# Patient Record
Sex: MALE
[De-identification: names, ages, dates, MRNs, and addresses within clinical notes are randomized per-mention and may not be internally consistent; named-entity substitution may affect disease eponyms.]

---

## 2019-11-05 ENCOUNTER — HOSPITAL ENCOUNTER (INPATIENT)
Dept: HOSPITAL 88 - ER | Age: 34
LOS: 10 days | Discharge: HOME | DRG: 871 | End: 2019-11-15
Attending: INTERNAL MEDICINE | Admitting: INTERNAL MEDICINE
Payer: SELF-PAY

## 2019-11-05 VITALS — HEIGHT: 65 IN | BODY MASS INDEX: 25.03 KG/M2 | WEIGHT: 150.25 LBS

## 2019-11-05 VITALS — DIASTOLIC BLOOD PRESSURE: 78 MMHG | SYSTOLIC BLOOD PRESSURE: 130 MMHG

## 2019-11-05 VITALS — SYSTOLIC BLOOD PRESSURE: 130 MMHG | DIASTOLIC BLOOD PRESSURE: 78 MMHG

## 2019-11-05 DIAGNOSIS — F17.200: ICD-10-CM

## 2019-11-05 DIAGNOSIS — A15.0: ICD-10-CM

## 2019-11-05 DIAGNOSIS — R91.1: ICD-10-CM

## 2019-11-05 DIAGNOSIS — J98.4: ICD-10-CM

## 2019-11-05 DIAGNOSIS — D69.6: ICD-10-CM

## 2019-11-05 DIAGNOSIS — E87.6: ICD-10-CM

## 2019-11-05 DIAGNOSIS — J13: ICD-10-CM

## 2019-11-05 DIAGNOSIS — A40.3: Primary | ICD-10-CM

## 2019-11-05 DIAGNOSIS — D64.9: ICD-10-CM

## 2019-11-05 LAB
ALBUMIN SERPL-MCNC: 3.3 G/DL (ref 3.5–5)
ALBUMIN/GLOB SERPL: 0.7 {RATIO} (ref 0.8–2)
ALP SERPL-CCNC: 58 IU/L (ref 40–150)
ALT SERPL-CCNC: 16 IU/L (ref 0–55)
ANION GAP SERPL CALC-SCNC: 15 MMOL/L (ref 8–16)
BACTERIA URNS QL MICRO: (no result) /HPF
BASOPHILS # BLD AUTO: 0.1 10*3/UL (ref 0–0.1)
BASOPHILS NFR BLD AUTO: 0.5 % (ref 0–1)
BILIRUB UR QL: NEGATIVE
BUN SERPL-MCNC: 11 MG/DL (ref 7–26)
BUN/CREAT SERPL: 11 (ref 6–25)
CALCIUM SERPL-MCNC: 10 MG/DL (ref 8.4–10.2)
CHLORIDE SERPL-SCNC: 91 MMOL/L (ref 98–107)
CK MB SERPL-MCNC: 0.1 NG/ML (ref 0–5)
CK SERPL-CCNC: 46 IU/L (ref 30–200)
CLARITY UR: (no result)
CO2 SERPL-SCNC: 24 MMOL/L (ref 22–29)
COLOR UR: YELLOW
DEPRECATED NEUTROPHILS # BLD AUTO: 15.2 10*3/UL (ref 2.1–6.9)
DEPRECATED RBC URNS MANUAL-ACNC: (no result) /HPF (ref 0–5)
EGFRCR SERPLBLD CKD-EPI 2021: > 60 ML/MIN (ref 60–?)
EOSINOPHIL # BLD AUTO: 0.1 10*3/UL (ref 0–0.4)
EOSINOPHIL NFR BLD AUTO: 0.3 % (ref 0–6)
EPI CELLS URNS QL MICRO: (no result) /LPF
ERYTHROCYTE [DISTWIDTH] IN CORD BLOOD: 13.3 % (ref 11.7–14.4)
FLUAV + FLUBV AG SPEC IF: NEGATIVE
GLOBULIN PLAS-MCNC: 4.9 G/DL (ref 2.3–3.5)
GLUCOSE SERPLBLD-MCNC: 117 MG/DL (ref 74–118)
HCT VFR BLD AUTO: 42 % (ref 38.2–49.6)
HGB BLD-MCNC: 14.6 G/DL (ref 14–18)
KETONES UR QL STRIP.AUTO: (no result)
LEUKOCYTE ESTERASE UR QL STRIP.AUTO: NEGATIVE
LYMPHOCYTES # BLD: 1 10*3/UL (ref 1–3.2)
LYMPHOCYTES NFR BLD AUTO: 5.7 % (ref 18–39.1)
LYMPHOCYTES NFR BLD MANUAL: 2 % (ref 19–48)
MAGNESIUM SERPL-MCNC: 1.6 MG/DL (ref 1.3–2.1)
MCH RBC QN AUTO: 30.2 PG (ref 28–32)
MCHC RBC AUTO-ENTMCNC: 34.8 G/DL (ref 31–35)
MCV RBC AUTO: 86.8 FL (ref 81–99)
MONOCYTES # BLD AUTO: 1.6 10*3/UL (ref 0.2–0.8)
MONOCYTES NFR BLD AUTO: 8.6 % (ref 4.4–11.3)
MONOCYTES NFR BLD MANUAL: 10 % (ref 3.4–9)
NEUTS SEG NFR BLD AUTO: 84.5 % (ref 38.7–80)
NEUTS SEG NFR BLD MANUAL: 86 % (ref 40–74)
NITRITE UR QL STRIP.AUTO: NEGATIVE
PLAT MORPH BLD: NORMAL
PLATELET # BLD AUTO: 116 X10E3/UL (ref 140–360)
PLATELET # BLD EST: ADEQUATE 10*3/UL
POTASSIUM SERPL-SCNC: 3 MMOL/L (ref 3.5–5.1)
PROT UR QL STRIP.AUTO: (no result)
RBC # BLD AUTO: 4.84 X10E6/UL (ref 4.3–5.7)
RBC MORPH BLD: NORMAL
S PYO AG THROAT QL: POSITIVE
SODIUM SERPL-SCNC: 127 MMOL/L (ref 136–145)
SP GR UR STRIP: 1.01 (ref 1.01–1.02)
UROBILINOGEN UR STRIP-MCNC: 1 MG/DL (ref 0.2–1)
WBC #/AREA URNS HPF: (no result) /HPF (ref 0–5)

## 2019-11-05 PROCEDURE — 83518 IMMUNOASSAY DIPSTICK: CPT

## 2019-11-05 PROCEDURE — 81001 URINALYSIS AUTO W/SCOPE: CPT

## 2019-11-05 PROCEDURE — 87206 SMEAR FLUORESCENT/ACID STAI: CPT

## 2019-11-05 PROCEDURE — 71045 X-RAY EXAM CHEST 1 VIEW: CPT

## 2019-11-05 PROCEDURE — 87109 MYCOPLASMA: CPT

## 2019-11-05 PROCEDURE — 93005 ELECTROCARDIOGRAM TRACING: CPT

## 2019-11-05 PROCEDURE — 88312 SPECIAL STAINS GROUP 1: CPT

## 2019-11-05 PROCEDURE — 31622 DX BRONCHOSCOPE/WASH: CPT

## 2019-11-05 PROCEDURE — 99284 EMERGENCY DEPT VISIT MOD MDM: CPT

## 2019-11-05 PROCEDURE — 88342 IMHCHEM/IMCYTCHM 1ST ANTB: CPT

## 2019-11-05 PROCEDURE — 86635 COCCIDIOIDES ANTIBODY: CPT

## 2019-11-05 PROCEDURE — 36415 COLL VENOUS BLD VENIPUNCTURE: CPT

## 2019-11-05 PROCEDURE — 94640 AIRWAY INHALATION TREATMENT: CPT

## 2019-11-05 PROCEDURE — 87070 CULTURE OTHR SPECIMN AEROBIC: CPT

## 2019-11-05 PROCEDURE — 82550 ASSAY OF CK (CPK): CPT

## 2019-11-05 PROCEDURE — 87385 HISTOPLASMA CAPSUL AG IA: CPT

## 2019-11-05 PROCEDURE — 87205 SMEAR GRAM STAIN: CPT

## 2019-11-05 PROCEDURE — 80053 COMPREHEN METABOLIC PANEL: CPT

## 2019-11-05 PROCEDURE — 87400 INFLUENZA A/B EACH AG IA: CPT

## 2019-11-05 PROCEDURE — 87040 BLOOD CULTURE FOR BACTERIA: CPT

## 2019-11-05 PROCEDURE — 83735 ASSAY OF MAGNESIUM: CPT

## 2019-11-05 PROCEDURE — 82553 CREATINE MB FRACTION: CPT

## 2019-11-05 PROCEDURE — 71046 X-RAY EXAM CHEST 2 VIEWS: CPT

## 2019-11-05 PROCEDURE — 87390 HIV-1 AG IA: CPT

## 2019-11-05 PROCEDURE — 87116 MYCOBACTERIA CULTURE: CPT

## 2019-11-05 PROCEDURE — 87102 FUNGUS ISOLATION CULTURE: CPT

## 2019-11-05 PROCEDURE — 88304 TISSUE EXAM BY PATHOLOGIST: CPT

## 2019-11-05 PROCEDURE — 71260 CT THORAX DX C+: CPT

## 2019-11-05 PROCEDURE — 84484 ASSAY OF TROPONIN QUANT: CPT

## 2019-11-05 PROCEDURE — 85025 COMPLETE CBC W/AUTO DIFF WBC: CPT

## 2019-11-05 PROCEDURE — 87335 E COLI 0157 AG IA: CPT

## 2019-11-05 RX ADMIN — SODIUM CHLORIDE SCH MLS/HR: 9 INJECTION, SOLUTION INTRAVENOUS at 18:12

## 2019-11-05 RX ADMIN — TAZOBACTAM SODIUM AND PIPERACILLIN SODIUM SCH MLS/HR: 375; 3 INJECTION, SOLUTION INTRAVENOUS at 21:22

## 2019-11-05 RX ADMIN — Medication SCH ML: at 23:30

## 2019-11-05 RX ADMIN — SODIUM CHLORIDE SCH MLS/HR: 9 INJECTION, SOLUTION INTRAVENOUS at 22:01

## 2019-11-05 NOTE — DIAGNOSTIC IMAGING REPORT
EXAMINATION:  CHEST 2 VIEWS    



INDICATION: Chest pain, shortness of breath



COMPARISON: None

     

FINDINGS:



LINES/TUBES:None



LUNGS:The lungs are well-inflated. There is a 6.5 x 5.5 cm consolidation at the

lateral left midlung zone with internal lucent component.



PLEURA:No pleural effusion or pneumothorax.



MEDIASTINUM:The cardiomediastinal silhouette appears normal in size and shape.



BONES/SOFT TISSUES:No acute osseous injury.



ABDOMEN:No free air under the diaphragm.





IMPRESSION: 

Left midlung zone 6.5 x 5.5 cm consolidation with internal lucency for which

the differential includes pneumonia or a cavitary mass lesion.



RECOMMENDATION:

Chest CT for further evaluation.



Signed by: Miryam Chinchilla MD on 11/5/2019 1:13 PM

## 2019-11-05 NOTE — XMS REPORT
Patient Summary Document

                             Created on: 2019



ROBERT VORA

External Reference #: [id number]

: 1985

Sex: Male



Demographics







                          Address                   [address] [place] [address]

 

                          Home Phone                [phone]

 

                          Preferred Language        Unknown

 

                          Marital Status            Unknown

 

                          Advent Affiliation     Unknown

 

                          Race                      Unknown

 

                                        Additional Race(s)  

 

                          Ethnic Group              Unknown





Author







                          Author                    [facility]

 

                          Address                   Unknown

 

                          Phone                     Unavailable







Care Team Providers







                    Care Team Member Name    Role                Phone

 

                    ERICA REYNOLDS    Unavailable         Unavailable







Problems

This patient has no known problems.



Allergies, Adverse Reactions, Alerts

This patient has no known allergies or adverse reactions.



Medications

This patient has no known medications.



Results







           Test Description    Test Time    Test Comments    Text Results    Atomic Results    Result

 Comments

 

                CHEST 2 VIEWS    2019 13:11:00                                                             

                                             [place] [address]  
   Patient Name: ROBERT VORA                                   MR #: 
[id number]                     : 1985                                  
Age/Sex: 34/M  Acct #: [id number]                              Req #: [id number]
[id number]  Adm Physician:                                                      
Ordered by: ERICA REYNOLDS MD, MD                            Report #: [id number]
       Location: ER                                      Room/Bed:              
      
___________________________________________________________________________________________________
   Procedure: [id number] DX/CHEST 2 VIEWS  Exam Date:                            
Exam Time:                                               REPORT STATUS: Signed  
 EXAMINATION:  CHEST 2 VIEWS          INDICATION: Chest pain, shortness of 
breath      COMPARISON: None           FINDINGS:      LINES/TUBES:None      
LUNGS:The lungs are well-inflated. There is a 6.5 x 5.5 cm consolidation at the 
 lateral left midlung zone with internal lucent component.      PLEURA:No 
pleural effusion or pneumothorax.      MEDIASTINUM:The cardiomediastinal 
silhouette appears normal in size and shape.      BONES/SOFT TISSUES:No acute 
osseous injury.      ABDOMEN:No free air under the diaphragm.         
IMPRESSION:    Left midlung zone 6.5 x 5.5 cm consolidation with internal 
lucency for which   the differential includes pneumonia or a cavitary mass 
lesion.      RECOMMENDATION:   Chest CT for further evaluation.      Signed by: 
Stacy Chinchilla MD on 2019 1:13 PM        Dictated By: STACY CHINCHILLA MD  
Electronically Signed By: STACY CHINCHILLA MD on 19 1313  Transcribed By: 
HI on 19       COPY TO:   ERICA REYNOLDS

## 2019-11-05 NOTE — DIAGNOSTIC IMAGING REPORT
CT chest with enhancement 



CPT code: 22093



INDICATION: Fever, cough



TECHNIQUE: 5 mm collimation axial images obtained from the thoracic inlet to

the level of the diaphragm following uneventful administration of 100 cc of low

osmolar, nonionic intravenous contrast.



RADIATION DOSE:

     Total DLP: 573.92 mGy*cm

     Estimated effective dose: (DLP x 0.015 x size factor) mSv

     CTDIvol has been reviewed. It is below the limits set by the Radiation

Protocol Committee (RPC).



Dose reduction techniques used: Automated exposure control, adjustment of the

mAs and/or kVp according to patient size, standardized low-dose protocol,

and/or iterative reconstruction technique.



Comparison: Chest x-ray 1301 hours.



CHEST FINDINGS:



Lymph nodes: No enlarged axillary or supraclavicular lymph nodes. There are

multiple prominent mediastinal lymph nodes, particularly in the AP window and

the subcarinal spaces. Left hilar lymph nodes are prominent.



Thyroid: Visualized portions are normal.



Mediastinum: No pericardial effusion.  Heart and great vessels enhance normally

without filling defects.  The esophagus is normal.



Lungs: 



Right Lung: Subtle band of subsegmental atelectasis in the posterior upper

lobe.



Left Lung: Large area of dense infiltrate in the upper lobe with several areas

of central cavitation, the largest measuring 1.8 cm. There are patchy areas of

groundglass attenuation in the superior segment of the lower lobe and in the

lingula.



Airways: Clear.



Pleura:  Trace posterior left pleural effusion. No pneumothorax.



ABDOMEN: 



There are coarse calcifications in the spleen. No soft tissue mass or

lymphadenopathy in the visualized upper abdomen.



Bones: No focal osseous lesions.



IMPRESSION:



1. Large infiltrate in the left upper lobe with cavitations and multiple

infiltrates in the lingula and lower lobe. Findings are suggestive of cavitary

pneumonia. Recommend close interval follow-up to document interval

change/resolution.



2.  Enlarged mediastinal and left hilar lymph nodes are likely reactive.



Signed by: Dr. Riley Treadwell MD on 11/5/2019 5:40 PM

## 2019-11-05 NOTE — NUR
Per Dr. Hamm place patient on airborne precautions to r/o TB due to CT 
chest read:  Large area of dense infiltrate in the upper lobe with several 
areas of central cavitation.

## 2019-11-05 NOTE — NUR
Patient received via stretcher from ER accompanied by friend. Patient is AAO x 3. Patient 
had no complaints of pain. Respirations even and non-labored. Admission history obtained. 
Initial physical assessment performed. Patient oriented to room, call light  and plan of 
care.  Airborne and fall precautions in place. IVF infusing at 125 cc/hr. Patient instructed 
to call for assistance when needed. Call light within reach.

## 2019-11-06 VITALS — DIASTOLIC BLOOD PRESSURE: 71 MMHG | SYSTOLIC BLOOD PRESSURE: 135 MMHG

## 2019-11-06 VITALS — SYSTOLIC BLOOD PRESSURE: 144 MMHG | DIASTOLIC BLOOD PRESSURE: 88 MMHG

## 2019-11-06 VITALS — SYSTOLIC BLOOD PRESSURE: 127 MMHG | DIASTOLIC BLOOD PRESSURE: 64 MMHG

## 2019-11-06 VITALS — DIASTOLIC BLOOD PRESSURE: 78 MMHG | SYSTOLIC BLOOD PRESSURE: 130 MMHG

## 2019-11-06 VITALS — DIASTOLIC BLOOD PRESSURE: 70 MMHG | SYSTOLIC BLOOD PRESSURE: 129 MMHG

## 2019-11-06 VITALS — SYSTOLIC BLOOD PRESSURE: 126 MMHG | DIASTOLIC BLOOD PRESSURE: 64 MMHG

## 2019-11-06 VITALS — SYSTOLIC BLOOD PRESSURE: 129 MMHG | DIASTOLIC BLOOD PRESSURE: 70 MMHG

## 2019-11-06 LAB
ALBUMIN SERPL-MCNC: 2.5 G/DL (ref 3.5–5)
ALBUMIN/GLOB SERPL: 0.6 {RATIO} (ref 0.8–2)
ALP SERPL-CCNC: 62 IU/L (ref 40–150)
ALT SERPL-CCNC: 15 IU/L (ref 0–55)
ANION GAP SERPL CALC-SCNC: 13.2 MMOL/L (ref 8–16)
BASOPHILS # BLD AUTO: 0.1 10*3/UL (ref 0–0.1)
BASOPHILS NFR BLD AUTO: 0.4 % (ref 0–1)
BUN SERPL-MCNC: 7 MG/DL (ref 7–26)
BUN/CREAT SERPL: 9 (ref 6–25)
CALCIUM SERPL-MCNC: 9 MG/DL (ref 8.4–10.2)
CHLORIDE SERPL-SCNC: 99 MMOL/L (ref 98–107)
CK MB SERPL-MCNC: 0.3 NG/ML (ref 0–5)
CK MB SERPL-MCNC: 0.3 NG/ML (ref 0–5)
CK SERPL-CCNC: 33 IU/L (ref 30–200)
CK SERPL-CCNC: 37 IU/L (ref 30–200)
CO2 SERPL-SCNC: 24 MMOL/L (ref 22–29)
DEPRECATED NEUTROPHILS # BLD AUTO: 11.4 10*3/UL (ref 2.1–6.9)
EGFRCR SERPLBLD CKD-EPI 2021: > 60 ML/MIN (ref 60–?)
EOSINOPHIL # BLD AUTO: 0 10*3/UL (ref 0–0.4)
EOSINOPHIL NFR BLD AUTO: 0.2 % (ref 0–6)
EOSINOPHIL NFR BLD MANUAL: 1 % (ref 0–7)
ERYTHROCYTE [DISTWIDTH] IN CORD BLOOD: 13.6 % (ref 11.7–14.4)
GLOBULIN PLAS-MCNC: 4.3 G/DL (ref 2.3–3.5)
GLUCOSE SERPLBLD-MCNC: 114 MG/DL (ref 74–118)
HCT VFR BLD AUTO: 36.7 % (ref 38.2–49.6)
HGB BLD-MCNC: 12.8 G/DL (ref 14–18)
HIV1+2 AB SPEC QL IA.RAPID: (no result)
LYMPHOCYTES # BLD: 0.4 10*3/UL (ref 1–3.2)
LYMPHOCYTES NFR BLD AUTO: 3.3 % (ref 18–39.1)
LYMPHOCYTES NFR BLD MANUAL: 7 % (ref 19–48)
MCH RBC QN AUTO: 30.1 PG (ref 28–32)
MCHC RBC AUTO-ENTMCNC: 34.9 G/DL (ref 31–35)
MCV RBC AUTO: 86.4 FL (ref 81–99)
MONOCYTES # BLD AUTO: 1.5 10*3/UL (ref 0.2–0.8)
MONOCYTES NFR BLD AUTO: 11.1 % (ref 4.4–11.3)
MONOCYTES NFR BLD MANUAL: 12 % (ref 3.4–9)
NEUTS BAND NFR BLD MANUAL: 1 %
NEUTS SEG NFR BLD AUTO: 84.2 % (ref 38.7–80)
NEUTS SEG NFR BLD MANUAL: 78 % (ref 40–74)
NRBC/RBC NFR BLD MANUAL: 1 %
PLAT MORPH BLD: (no result)
PLATELET # BLD AUTO: 120 X10E3/UL (ref 140–360)
PLATELET # BLD EST: (no result) 10*3/UL
POTASSIUM SERPL-SCNC: 3.2 MMOL/L (ref 3.5–5.1)
RBC # BLD AUTO: 4.25 X10E6/UL (ref 4.3–5.7)
RBC MORPH BLD: NORMAL
SODIUM SERPL-SCNC: 133 MMOL/L (ref 136–145)

## 2019-11-06 RX ADMIN — TAZOBACTAM SODIUM AND PIPERACILLIN SODIUM SCH MLS/HR: 375; 3 INJECTION, SOLUTION INTRAVENOUS at 02:15

## 2019-11-06 RX ADMIN — Medication PRN MG: at 00:09

## 2019-11-06 RX ADMIN — Medication SCH ML: at 07:00

## 2019-11-06 RX ADMIN — Medication SCH ML: at 11:00

## 2019-11-06 RX ADMIN — SODIUM CHLORIDE SCH MLS/HR: 9 INJECTION, SOLUTION INTRAVENOUS at 22:01

## 2019-11-06 RX ADMIN — Medication SCH ML: at 18:55

## 2019-11-06 RX ADMIN — TAZOBACTAM SODIUM AND PIPERACILLIN SODIUM SCH MLS/HR: 375; 3 INJECTION, SOLUTION INTRAVENOUS at 13:13

## 2019-11-06 RX ADMIN — SODIUM CHLORIDE SCH MLS/HR: 9 INJECTION, SOLUTION INTRAVENOUS at 17:12

## 2019-11-06 RX ADMIN — TAZOBACTAM SODIUM AND PIPERACILLIN SODIUM SCH MLS/HR: 375; 3 INJECTION, SOLUTION INTRAVENOUS at 07:53

## 2019-11-06 RX ADMIN — POTASSIUM CHLORIDE SCH MEQ: 1500 TABLET, EXTENDED RELEASE ORAL at 05:47

## 2019-11-06 RX ADMIN — POTASSIUM CHLORIDE SCH MEQ: 1500 TABLET, EXTENDED RELEASE ORAL at 17:12

## 2019-11-06 RX ADMIN — Medication SCH ML: at 04:00

## 2019-11-06 RX ADMIN — Medication SCH ML: at 18:50

## 2019-11-06 RX ADMIN — TAZOBACTAM SODIUM AND PIPERACILLIN SODIUM SCH MLS/HR: 375; 3 INJECTION, SOLUTION INTRAVENOUS at 21:31

## 2019-11-06 RX ADMIN — SODIUM CHLORIDE SCH MLS/HR: 9 INJECTION, SOLUTION INTRAVENOUS at 05:48

## 2019-11-06 RX ADMIN — LEVOFLOXACIN SCH MLS/HR: 5 INJECTION, SOLUTION INTRAVENOUS at 08:29

## 2019-11-06 RX ADMIN — Medication SCH ML: at 23:15

## 2019-11-06 RX ADMIN — Medication PRN MG: at 17:13

## 2019-11-06 RX ADMIN — Medication SCH ML: at 15:10

## 2019-11-06 RX ADMIN — Medication PRN MG: at 08:29

## 2019-11-06 NOTE — NUR
RECEIVED CALLBACK FROM DR. HENDRICKSON- DR. HENDRICKSON INFORMED ON PATIENT'S TEMPERATURE .9 

ORDER TO CONTINUE ZOSYN 3.375GM Q6H. NO ORDER FOR BLOOD CULTURES AT THIS TIME; PATIENT'S 
LAST BLOOD CULTURES WERE OBTAINED ON 11/5/19.

## 2019-11-06 NOTE — NUR
CALL PLACED OUT TO DR. HENDRICKSON REGARDING PATIENT'S TEMPERATURE .9- AWAITING CALLBACK. 
PO TYLENOL ADMINISTERED TO PATIENT.

## 2019-11-06 NOTE — DIAGNOSTIC IMAGING REPORT
EXAMINATION:  CHEST SINGLE (PORTABLE)    



INDICATION: Pneumonia. 



COMPARISON:  Chest radiograph and CT Chest 11/5/2019.

     

FINDINGS:

TUBES and LINES:  None.



LUNGS: Low lung volumes. Persistent cavitary consolidation in the left midlung.

Mild patchy right lower lung opacity.



PLEURA:  No pleural effusion or pneumothorax. 



HEART AND MEDIASTINUM:  The cardiomediastinal silhouette is unchanged.



BONES AND SOFT TISSUES:  No acute osseous abnormality.



UPPER ABDOMEN: No free air under the diaphragm.    



IMPRESSION: 

Persistent cavitary consolidation in the left lung, consistent with pneumonia

as seen on CT chest. Mild patchy right lower lung opacity may represent

developing pneumonia or atelectasis.



Signed by: Dr. Yoli Marsh MD on 11/6/2019 5:27 AM

## 2019-11-06 NOTE — NUR
PATIENT IS AWAKE AND IN STABLE CONDITION WITH NO S/S OF RESPIRATORY DISTRESS. NO PAIN 
VOICED. IV FLUIDS INFUSING. TELEMETRY APPLIED. CALL LIGHT IS WITHIN REACH, PATIENT 
INSTRUCTED TO CALL FOR ASSISTANCE AS NEEDED.

## 2019-11-06 NOTE — NUR
Dr. CORRIE Hoover paged for "Routine Consult" of patient. Reason: Left PNA, Cavitary. Awaiting 
call back.

## 2019-11-06 NOTE — NUR
Dr. ALBERTO Walker paged for "Routine Consult" of patient. Reason: Cavitary Pneumonia. Awaiting 
call back.

## 2019-11-06 NOTE — NUR
PATIENT IS IN STABLE CONDITION WITH NO S/S OF RESPIRATORY DISTRESS-NO PAIN VOICED. CALL 
LIGHT IS WITHIN REACH, PATIENT INSTRUCTED TO CALL FOR ASSISTANCE AS NEEDED. FRIEND PRESENT 
IN ROOM. REPORT GIVEN TO ONCOMING NURSE.

## 2019-11-07 VITALS — SYSTOLIC BLOOD PRESSURE: 127 MMHG | DIASTOLIC BLOOD PRESSURE: 62 MMHG

## 2019-11-07 VITALS — DIASTOLIC BLOOD PRESSURE: 82 MMHG | SYSTOLIC BLOOD PRESSURE: 137 MMHG

## 2019-11-07 VITALS — DIASTOLIC BLOOD PRESSURE: 80 MMHG | SYSTOLIC BLOOD PRESSURE: 132 MMHG

## 2019-11-07 VITALS — DIASTOLIC BLOOD PRESSURE: 69 MMHG | SYSTOLIC BLOOD PRESSURE: 118 MMHG

## 2019-11-07 VITALS — SYSTOLIC BLOOD PRESSURE: 125 MMHG | DIASTOLIC BLOOD PRESSURE: 75 MMHG

## 2019-11-07 VITALS — SYSTOLIC BLOOD PRESSURE: 137 MMHG | DIASTOLIC BLOOD PRESSURE: 82 MMHG

## 2019-11-07 VITALS — DIASTOLIC BLOOD PRESSURE: 94 MMHG | SYSTOLIC BLOOD PRESSURE: 153 MMHG

## 2019-11-07 LAB
ALBUMIN SERPL-MCNC: 2.3 G/DL (ref 3.5–5)
ALBUMIN/GLOB SERPL: 0.5 {RATIO} (ref 0.8–2)
ALP SERPL-CCNC: 89 IU/L (ref 40–150)
ALT SERPL-CCNC: 60 IU/L (ref 0–55)
ANION GAP SERPL CALC-SCNC: 12.1 MMOL/L (ref 8–16)
BASOPHILS # BLD AUTO: 0.1 10*3/UL (ref 0–0.1)
BASOPHILS NFR BLD AUTO: 0.4 % (ref 0–1)
BUN SERPL-MCNC: < 5 MG/DL (ref 7–26)
BUN/CREAT SERPL: 7 (ref 6–25)
CALCIUM SERPL-MCNC: 9 MG/DL (ref 8.4–10.2)
CHLORIDE SERPL-SCNC: 99 MMOL/L (ref 98–107)
CO2 SERPL-SCNC: 25 MMOL/L (ref 22–29)
DEPRECATED NEUTROPHILS # BLD AUTO: 8.4 10*3/UL (ref 2.1–6.9)
EGFRCR SERPLBLD CKD-EPI 2021: > 60 ML/MIN (ref 60–?)
EOSINOPHIL # BLD AUTO: 0 10*3/UL (ref 0–0.4)
EOSINOPHIL NFR BLD AUTO: 0.3 % (ref 0–6)
ERYTHROCYTE [DISTWIDTH] IN CORD BLOOD: 14 % (ref 11.7–14.4)
GLOBULIN PLAS-MCNC: 4.3 G/DL (ref 2.3–3.5)
GLUCOSE SERPLBLD-MCNC: 103 MG/DL (ref 74–118)
HCT VFR BLD AUTO: 35.7 % (ref 38.2–49.6)
HGB BLD-MCNC: 12.3 G/DL (ref 14–18)
LYMPHOCYTES # BLD: 0.9 10*3/UL (ref 1–3.2)
LYMPHOCYTES NFR BLD AUTO: 8.1 % (ref 18–39.1)
MAGNESIUM SERPL-MCNC: 1.7 MG/DL (ref 1.3–2.1)
MCH RBC QN AUTO: 29.8 PG (ref 28–32)
MCHC RBC AUTO-ENTMCNC: 34.5 G/DL (ref 31–35)
MCV RBC AUTO: 86.4 FL (ref 81–99)
MONOCYTES # BLD AUTO: 1.6 10*3/UL (ref 0.2–0.8)
MONOCYTES NFR BLD AUTO: 14.6 % (ref 4.4–11.3)
NEUTS SEG NFR BLD AUTO: 75 % (ref 38.7–80)
PLAT MORPH BLD: (no result)
PLATELET # BLD AUTO: 135 X10E3/UL (ref 140–360)
PLATELET # BLD EST: (no result) 10*3/UL
POTASSIUM SERPL-SCNC: 3.1 MMOL/L (ref 3.5–5.1)
RBC # BLD AUTO: 4.13 X10E6/UL (ref 4.3–5.7)
RBC MORPH BLD: NORMAL
SODIUM SERPL-SCNC: 133 MMOL/L (ref 136–145)

## 2019-11-07 RX ADMIN — Medication SCH ML: at 03:20

## 2019-11-07 RX ADMIN — SODIUM CHLORIDE SCH MLS/HR: 9 INJECTION, SOLUTION INTRAVENOUS at 16:08

## 2019-11-07 RX ADMIN — Medication SCH ML: at 15:23

## 2019-11-07 RX ADMIN — Medication SCH ML: at 22:45

## 2019-11-07 RX ADMIN — Medication SCH ML: at 18:55

## 2019-11-07 RX ADMIN — Medication PRN MG: at 00:41

## 2019-11-07 RX ADMIN — POTASSIUM CHLORIDE SCH MEQ: 1500 TABLET, EXTENDED RELEASE ORAL at 17:16

## 2019-11-07 RX ADMIN — TAZOBACTAM SODIUM AND PIPERACILLIN SODIUM SCH MLS/HR: 375; 3 INJECTION, SOLUTION INTRAVENOUS at 13:23

## 2019-11-07 RX ADMIN — Medication SCH ML: at 07:27

## 2019-11-07 RX ADMIN — POTASSIUM CHLORIDE SCH MEQ: 1500 TABLET, EXTENDED RELEASE ORAL at 05:51

## 2019-11-07 RX ADMIN — LEVOFLOXACIN SCH MLS/HR: 5 INJECTION, SOLUTION INTRAVENOUS at 08:05

## 2019-11-07 RX ADMIN — Medication SCH ML: at 11:28

## 2019-11-07 RX ADMIN — SODIUM CHLORIDE SCH MLS/HR: 9 INJECTION, SOLUTION INTRAVENOUS at 06:01

## 2019-11-07 RX ADMIN — TAZOBACTAM SODIUM AND PIPERACILLIN SODIUM SCH MLS/HR: 375; 3 INJECTION, SOLUTION INTRAVENOUS at 02:39

## 2019-11-07 RX ADMIN — TAZOBACTAM SODIUM AND PIPERACILLIN SODIUM SCH MLS/HR: 375; 3 INJECTION, SOLUTION INTRAVENOUS at 07:22

## 2019-11-07 RX ADMIN — TAZOBACTAM SODIUM AND PIPERACILLIN SODIUM SCH MLS/HR: 375; 3 INJECTION, SOLUTION INTRAVENOUS at 20:00

## 2019-11-07 NOTE — NUR
GAVE PACKET OF INFORMATION WITH COMMUNITY RESOURCES FOR ASSISTANCE WITH LOW TO NO INCOME TO 
PATIENT.  RESOURCES THAT PATIENT MAY BE ABLE TO FOLLOW UP UPON DISCHARGE. PT EDUCATED ON 
EACH RESOURCE AND UNDERSTANDING HOW TO FOLLOW UP TO SEE IF QUALIFIED FOR EACH RESOURCE.

## 2019-11-07 NOTE — NUR
INFORMED DR. GUILLERMO OF PATIENT'S POTASSIUM LEVEL OF 3.1- RECEIVED NEW ORDERS TO GIVE PO 
POTASSIUM 40MEQ NOW AND AGAIN IN 12 HOURS.

## 2019-11-07 NOTE — NUR
PATIENT IS IN STABLE CONDITION WITH NO S/S OF RESPIRATORY DISTRESS-NO PAIN VOICED. IV FLUIDS 
INFUSING. PATIENT WILL BE NPO AFTER MIDNIGHT AND IS AWARE. CALL LIGHT IS WITHIN REACH, 
PATIENT INSTRUCTED TO CALL FOR ASSISTANCE AS NEEDED. REPORT GIVEN TO ONCOMING NURSE.

## 2019-11-07 NOTE — CONSULTATION
DATE OF CONSULTATION:  

 

Pulmonary Consultation 

 

REASON FOR CONSULT:  Abnormal CT chest, shortness of breath.

 

HISTORY OF PRESENT ILLNESS:  Mr. Ro is a 34-year-old male, who presented to the

emergency room with complaints of shortness of breath.  He is originally from [place],

but he moved to [place] when he was 16 years old and since then he has never gone

back.  He reported that he was having chest discomfort and shortness of breath, where we

decided to come to the emergency room.  He felt that he is having flu-like symptoms.

His shortness of breath and chest pain was episodic, it was relieved by rest and it was

associated with cough.  In the emergency room, the patient underwent a CT of the chest,

which showed evidence of large infiltrate and cavitation in the lingula in the lower

lobe on the left side.  He denies any nausea, vomiting.  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 

 

REVIEW OF SYSTEMS:

GENERAL:  Denies any fever, chills. 

HEAD:  Denies any head trauma. 

ENT:  Denies any earache. 

CVS:  Denies any chest pain. 

RESPIRATORY:  Shortness of breath. 

 

The rest of the review of systems are negative except as in HPI.

 

PAST MEDICAL HISTORY:  None.

 

PAST SURGICAL HISTORY:  None.

 

FAMILY AND SOCIAL HISTORY:  He smokes.  He has been a smoker for 20+ years.  Denies any

alcohol use. 

 

PHYSICAL EXAMINATION:

VITAL SIGNS:  Temperature 99.2, pulse of 95, blood pressure 127/64, respiratory rate of

18, and T-max of 101.2. 

HEENT:  Head atraumatic, normocephalic. 

NECK:  Supple. 

CHEST:  Crackles on the bases. 

HEART:  S1-S2 audible. 

ABDOMEN:  Soft, nontender. 

EXTREMITIES:  No pedal edema. 

NEUROLOGIC:  Awake and alert.  No focal neurologic deficits.

LABORATORY DATA:  [place] count of [address] and down to 13,000 since the antibiotic has been

started.  Hemoglobin 14.6, platelets 116, 120 now.  One set of sputum culture sent. 

 

A CT of the chest, I reviewed the images of the lingula and left lower lobe to his dense

area of consolidation and there is possibly a cavity.  However, it appears that it is a

dense consolidation with some clearing. 

 

ASSESSMENT:  Mr. Ro is a 34-year-old male, who presented to the emergency room with

cough and chest pain with occasional shortness of breath.  He is a smoker.  CT chest

finding reviewed.  Current problems: 

1. Likely cavitary pneumonia, possibility of tuberculosis or fungal infection as there.

2. Smoker.

 

PLAN:  

1. Agree with IV antibiotics.  ID consultation has been done.  White cell count is

improving with IV Zosyn.  Continue nebulizer treatment. 

2. Possibly, we will need a bronchoscopy if unable to give enough sample.  We will

discuss with him in a.m. 

Thank you for this consult.

 

 

 

 

______________________________

MD TALON Palmer/MARIA ESTHER

D:  11/06/2019 23:00:43

T:  11/07/2019 05:30:19

Job #:  [id number]

## 2019-11-07 NOTE — NUR
RECEIVE DPT SITTING ON THE SIDE OF THE BED .NO ACUTE DISTRESS NOTED .CALL LIGHT WITH IN 
REACH .FAMILY AT THE BEDSIDE .CONTINUE TO MONITOR

## 2019-11-08 VITALS — DIASTOLIC BLOOD PRESSURE: 68 MMHG | SYSTOLIC BLOOD PRESSURE: 131 MMHG

## 2019-11-08 VITALS — SYSTOLIC BLOOD PRESSURE: 125 MMHG | DIASTOLIC BLOOD PRESSURE: 67 MMHG

## 2019-11-08 VITALS — DIASTOLIC BLOOD PRESSURE: 80 MMHG | SYSTOLIC BLOOD PRESSURE: 136 MMHG

## 2019-11-08 VITALS — SYSTOLIC BLOOD PRESSURE: 128 MMHG | DIASTOLIC BLOOD PRESSURE: 79 MMHG

## 2019-11-08 VITALS — DIASTOLIC BLOOD PRESSURE: 80 MMHG | SYSTOLIC BLOOD PRESSURE: 129 MMHG

## 2019-11-08 VITALS — SYSTOLIC BLOOD PRESSURE: 131 MMHG | DIASTOLIC BLOOD PRESSURE: 68 MMHG

## 2019-11-08 LAB
ALBUMIN SERPL-MCNC: 2.4 G/DL (ref 3.5–5)
ALBUMIN/GLOB SERPL: 0.5 {RATIO} (ref 0.8–2)
ALP SERPL-CCNC: 143 IU/L (ref 40–150)
ALT SERPL-CCNC: 123 IU/L (ref 0–55)
ANION GAP SERPL CALC-SCNC: 12.9 MMOL/L (ref 8–16)
ANISOCYTOSIS BLD QL SMEAR: SLIGHT
BASOPHILS # BLD AUTO: 0.1 10*3/UL (ref 0–0.1)
BASOPHILS NFR BLD AUTO: 0.4 % (ref 0–1)
BUN SERPL-MCNC: < 5 MG/DL (ref 7–26)
BUN/CREAT SERPL: 7 (ref 6–25)
CALCIUM SERPL-MCNC: 9.3 MG/DL (ref 8.4–10.2)
CHLORIDE SERPL-SCNC: 102 MMOL/L (ref 98–107)
CO2 SERPL-SCNC: 23 MMOL/L (ref 22–29)
DEPRECATED NEUTROPHILS # BLD AUTO: 8.8 10*3/UL (ref 2.1–6.9)
EGFRCR SERPLBLD CKD-EPI 2021: > 60 ML/MIN (ref 60–?)
EOSINOPHIL # BLD AUTO: 0 10*3/UL (ref 0–0.4)
EOSINOPHIL NFR BLD AUTO: 0.2 % (ref 0–6)
ERYTHROCYTE [DISTWIDTH] IN CORD BLOOD: 14.6 % (ref 11.7–14.4)
GLOBULIN PLAS-MCNC: 4.4 G/DL (ref 2.3–3.5)
GLUCOSE SERPLBLD-MCNC: 105 MG/DL (ref 74–118)
HCT VFR BLD AUTO: 36.3 % (ref 38.2–49.6)
HGB BLD-MCNC: 12.3 G/DL (ref 14–18)
LYMPHOCYTES # BLD: 0.9 10*3/UL (ref 1–3.2)
LYMPHOCYTES NFR BLD AUTO: 7.9 % (ref 18–39.1)
LYMPHOCYTES NFR BLD MANUAL: 12 % (ref 19–48)
MACROCYTES BLD QL SMEAR: SLIGHT
MCH RBC QN AUTO: 29.9 PG (ref 28–32)
MCHC RBC AUTO-ENTMCNC: 33.9 G/DL (ref 31–35)
MCV RBC AUTO: 88.1 FL (ref 81–99)
MICROCYTES BLD QL SMEAR: SLIGHT
MONOCYTES # BLD AUTO: 1.6 10*3/UL (ref 0.2–0.8)
MONOCYTES NFR BLD AUTO: 13.4 % (ref 4.4–11.3)
MONOCYTES NFR BLD MANUAL: 14 % (ref 3.4–9)
NEUTS BAND NFR BLD MANUAL: 1 %
NEUTS SEG NFR BLD AUTO: 75.4 % (ref 38.7–80)
NEUTS SEG NFR BLD MANUAL: 73 % (ref 40–74)
PLAT MORPH BLD: (no result)
PLATELET # BLD AUTO: 192 X10E3/UL (ref 140–360)
PLATELET # BLD EST: ADEQUATE 10*3/UL
POTASSIUM SERPL-SCNC: 3.9 MMOL/L (ref 3.5–5.1)
RBC # BLD AUTO: 4.12 X10E6/UL (ref 4.3–5.7)
RBC MORPH BLD: (no result)
SODIUM SERPL-SCNC: 134 MMOL/L (ref 136–145)

## 2019-11-08 PROCEDURE — 0BJ08ZZ INSPECTION OF TRACHEOBRONCHIAL TREE, VIA NATURAL OR ARTIFICIAL OPENING ENDOSCOPIC: ICD-10-PCS | Performed by: INTERNAL MEDICINE

## 2019-11-08 PROCEDURE — 0BB98ZX EXCISION OF LINGULA BRONCHUS, VIA NATURAL OR ARTIFICIAL OPENING ENDOSCOPIC, DIAGNOSTIC: ICD-10-PCS | Performed by: INTERNAL MEDICINE

## 2019-11-08 PROCEDURE — 0B9G8ZX DRAINAGE OF LEFT UPPER LUNG LOBE, VIA NATURAL OR ARTIFICIAL OPENING ENDOSCOPIC, DIAGNOSTIC: ICD-10-PCS | Performed by: INTERNAL MEDICINE

## 2019-11-08 PROCEDURE — 0BD98ZX EXTRACTION OF LINGULA BRONCHUS, VIA NATURAL OR ARTIFICIAL OPENING ENDOSCOPIC, DIAGNOSTIC: ICD-10-PCS | Performed by: INTERNAL MEDICINE

## 2019-11-08 RX ADMIN — Medication SCH ML: at 19:40

## 2019-11-08 RX ADMIN — Medication SCH ML: at 07:12

## 2019-11-08 RX ADMIN — SODIUM CHLORIDE SCH MLS/HR: 9 INJECTION, SOLUTION INTRAVENOUS at 00:36

## 2019-11-08 RX ADMIN — Medication SCH ML: at 23:00

## 2019-11-08 RX ADMIN — LEVOFLOXACIN SCH MLS/HR: 5 INJECTION, SOLUTION INTRAVENOUS at 09:00

## 2019-11-08 RX ADMIN — SODIUM CHLORIDE SCH MLS/HR: 9 INJECTION, SOLUTION INTRAVENOUS at 06:04

## 2019-11-08 RX ADMIN — POTASSIUM CHLORIDE SCH MEQ: 1500 TABLET, EXTENDED RELEASE ORAL at 06:00

## 2019-11-08 RX ADMIN — TAZOBACTAM SODIUM AND PIPERACILLIN SODIUM SCH MLS/HR: 375; 3 INJECTION, SOLUTION INTRAVENOUS at 14:23

## 2019-11-08 RX ADMIN — POTASSIUM CHLORIDE SCH MEQ: 1500 TABLET, EXTENDED RELEASE ORAL at 17:54

## 2019-11-08 RX ADMIN — SODIUM CHLORIDE SCH MLS/HR: 9 INJECTION, SOLUTION INTRAVENOUS at 19:58

## 2019-11-08 RX ADMIN — SODIUM CHLORIDE SCH MLS/HR: 9 INJECTION, SOLUTION INTRAVENOUS at 14:01

## 2019-11-08 RX ADMIN — TAZOBACTAM SODIUM AND PIPERACILLIN SODIUM SCH MLS/HR: 375; 3 INJECTION, SOLUTION INTRAVENOUS at 19:58

## 2019-11-08 RX ADMIN — Medication PRN MG: at 19:58

## 2019-11-08 RX ADMIN — Medication SCH ML: at 15:30

## 2019-11-08 RX ADMIN — Medication SCH ML: at 11:00

## 2019-11-08 RX ADMIN — TAZOBACTAM SODIUM AND PIPERACILLIN SODIUM SCH MLS/HR: 375; 3 INJECTION, SOLUTION INTRAVENOUS at 02:00

## 2019-11-08 RX ADMIN — Medication SCH ML: at 02:55

## 2019-11-08 RX ADMIN — TAZOBACTAM SODIUM AND PIPERACILLIN SODIUM SCH MLS/HR: 375; 3 INJECTION, SOLUTION INTRAVENOUS at 08:29

## 2019-11-08 NOTE — DIAGNOSTIC IMAGING REPORT
EXAMINATION:  CHEST SINGLE (PORTABLE)    



INDICATION: Pneumonia



COMPARISON: Chest radiograph of 11/6/2019

     

FINDINGS:



LINES/TUBES:EKG leads overlie the chest.



LUNGS:The lungs are moderately inflated. Unchanged consolidation with cavitary

component at the right upper lobe. Patchy bibasilar opacities.



PLEURA:No pleural effusion or pneumothorax.



MEDIASTINUM:The cardiomediastinal silhouette appears unchanged in size and

shape.



BONES/SOFT TISSUES:No acute osseous injury.



ABDOMEN:No free air under the diaphragm.





IMPRESSION: 

Unchanged left upper lobe consolidation with cavitary component consistent with

pneumonia.



Patchy bibasilar opacities, likely subsegmental atelectasis.



Signed by: Miryam Chinchilla MD on 11/8/2019 1:03 PM

## 2019-11-08 NOTE — OPERATIVE REPORT
DATE OF PROCEDURE:  

 

SURGEON:  Magda Matthews MD

 

PREPROCEDURE DIAGNOSIS:  Cavitary pneumonia.

 

POSTPROCEDURE DIAGNOSES:  Left upper lobe lingular cavity, no mucopurulent since

secretion, thin tenacious secretion. 

 

ASSISTANT:  None.

 

ANESTHESIA:  General.

 

PROCEDURE IN DETAIL:  Bronchoscope was advanced through the LMA.  Both lungs were

examined to the segmental level.  Left upper lobe and lingula were showing thick clear

secretion.  Right upper lobe, lower lobe, and middle lobe showed thin secretions.  No

mucoid purulence. 

BAL was done from the lingula.  Transbronchial lung biopsy was done from lingula and

sent for pathology.  Samples were sent for Gram stain, culture, fungus, and AFB. 

 

 

 

______________________________

MD TALON Palmer/MODL

D:  11/08/2019 11:27:37

T:  11/08/2019 18:01:44

Job #:  [id number]

## 2019-11-08 NOTE — NUR
Received bedside report from day nurse. Patient resting in bed, no s/s of distress or c/o 
pain at this time. All safety measures in place. Will continue to monitor.

## 2019-11-09 VITALS — DIASTOLIC BLOOD PRESSURE: 64 MMHG | SYSTOLIC BLOOD PRESSURE: 129 MMHG

## 2019-11-09 VITALS — SYSTOLIC BLOOD PRESSURE: 127 MMHG | DIASTOLIC BLOOD PRESSURE: 57 MMHG

## 2019-11-09 VITALS — DIASTOLIC BLOOD PRESSURE: 75 MMHG | SYSTOLIC BLOOD PRESSURE: 135 MMHG

## 2019-11-09 VITALS — SYSTOLIC BLOOD PRESSURE: 129 MMHG | DIASTOLIC BLOOD PRESSURE: 63 MMHG

## 2019-11-09 VITALS — SYSTOLIC BLOOD PRESSURE: 138 MMHG | DIASTOLIC BLOOD PRESSURE: 70 MMHG

## 2019-11-09 VITALS — DIASTOLIC BLOOD PRESSURE: 67 MMHG | SYSTOLIC BLOOD PRESSURE: 116 MMHG

## 2019-11-09 VITALS — DIASTOLIC BLOOD PRESSURE: 57 MMHG | SYSTOLIC BLOOD PRESSURE: 127 MMHG

## 2019-11-09 VITALS — DIASTOLIC BLOOD PRESSURE: 71 MMHG | SYSTOLIC BLOOD PRESSURE: 120 MMHG

## 2019-11-09 LAB
ALBUMIN SERPL-MCNC: 2.1 G/DL (ref 3.5–5)
ALBUMIN/GLOB SERPL: 0.5 {RATIO} (ref 0.8–2)
ALP SERPL-CCNC: 161 IU/L (ref 40–150)
ALT SERPL-CCNC: 91 IU/L (ref 0–55)
ANION GAP SERPL CALC-SCNC: 12.9 MMOL/L (ref 8–16)
BASOPHILS # BLD AUTO: 0.1 10*3/UL (ref 0–0.1)
BASOPHILS NFR BLD AUTO: 0.6 % (ref 0–1)
BUN SERPL-MCNC: < 5 MG/DL (ref 7–26)
BUN/CREAT SERPL: 7 (ref 6–25)
CALCIUM SERPL-MCNC: 9.3 MG/DL (ref 8.4–10.2)
CHLORIDE SERPL-SCNC: 101 MMOL/L (ref 98–107)
CO2 SERPL-SCNC: 24 MMOL/L (ref 22–29)
DEPRECATED NEUTROPHILS # BLD AUTO: 10.1 10*3/UL (ref 2.1–6.9)
EGFRCR SERPLBLD CKD-EPI 2021: > 60 ML/MIN (ref 60–?)
EOSINOPHIL # BLD AUTO: 0.1 10*3/UL (ref 0–0.4)
EOSINOPHIL NFR BLD AUTO: 0.4 % (ref 0–6)
ERYTHROCYTE [DISTWIDTH] IN CORD BLOOD: 14.7 % (ref 11.7–14.4)
GLOBULIN PLAS-MCNC: 4.6 G/DL (ref 2.3–3.5)
GLUCOSE SERPLBLD-MCNC: 102 MG/DL (ref 74–118)
HCT VFR BLD AUTO: 35.8 % (ref 38.2–49.6)
HGB BLD-MCNC: 12 G/DL (ref 14–18)
LYMPHOCYTES # BLD: 1.4 10*3/UL (ref 1–3.2)
LYMPHOCYTES NFR BLD AUTO: 10.2 % (ref 18–39.1)
MCH RBC QN AUTO: 29.9 PG (ref 28–32)
MCHC RBC AUTO-ENTMCNC: 33.5 G/DL (ref 31–35)
MCV RBC AUTO: 89.3 FL (ref 81–99)
MONOCYTES # BLD AUTO: 1.4 10*3/UL (ref 0.2–0.8)
MONOCYTES NFR BLD AUTO: 10.2 % (ref 4.4–11.3)
NEUTS SEG NFR BLD AUTO: 73.5 % (ref 38.7–80)
PLATELET # BLD AUTO: 280 X10E3/UL (ref 140–360)
POTASSIUM SERPL-SCNC: 3.9 MMOL/L (ref 3.5–5.1)
RBC # BLD AUTO: 4.01 X10E6/UL (ref 4.3–5.7)
SODIUM SERPL-SCNC: 134 MMOL/L (ref 136–145)

## 2019-11-09 RX ADMIN — TAZOBACTAM SODIUM AND PIPERACILLIN SODIUM SCH MLS/HR: 375; 3 INJECTION, SOLUTION INTRAVENOUS at 02:30

## 2019-11-09 RX ADMIN — POTASSIUM CHLORIDE SCH MEQ: 1500 TABLET, EXTENDED RELEASE ORAL at 05:58

## 2019-11-09 RX ADMIN — TAZOBACTAM SODIUM AND PIPERACILLIN SODIUM SCH MLS/HR: 375; 3 INJECTION, SOLUTION INTRAVENOUS at 08:00

## 2019-11-09 RX ADMIN — Medication SCH ML: at 01:00

## 2019-11-09 RX ADMIN — Medication SCH ML: at 06:30

## 2019-11-09 RX ADMIN — Medication PRN MG: at 20:02

## 2019-11-09 RX ADMIN — TAZOBACTAM SODIUM AND PIPERACILLIN SODIUM SCH MLS/HR: 375; 3 INJECTION, SOLUTION INTRAVENOUS at 20:01

## 2019-11-09 RX ADMIN — Medication SCH ML: at 03:00

## 2019-11-09 RX ADMIN — Medication SCH ML: at 19:00

## 2019-11-09 RX ADMIN — POTASSIUM CHLORIDE SCH MEQ: 1500 TABLET, EXTENDED RELEASE ORAL at 18:00

## 2019-11-09 RX ADMIN — Medication PRN MG: at 03:51

## 2019-11-09 RX ADMIN — Medication SCH ML: at 10:30

## 2019-11-09 RX ADMIN — SODIUM CHLORIDE SCH MLS/HR: 9 INJECTION, SOLUTION INTRAVENOUS at 05:58

## 2019-11-09 RX ADMIN — Medication SCH ML: at 14:40

## 2019-11-09 RX ADMIN — LEVOFLOXACIN SCH MLS/HR: 5 INJECTION, SOLUTION INTRAVENOUS at 09:00

## 2019-11-09 RX ADMIN — TAZOBACTAM SODIUM AND PIPERACILLIN SODIUM SCH MLS/HR: 375; 3 INJECTION, SOLUTION INTRAVENOUS at 14:00

## 2019-11-09 RX ADMIN — Medication SCH ML: at 23:00

## 2019-11-09 NOTE — NUR
Gave bedside report to day nurse. Patient resting in bed, no s/s of distress or c/o pain at 
this time. All safety measures in place. Family at bedside.

## 2019-11-09 NOTE — NUR
Received bedside report from day nurse. Patient resting in bed, alert and oriented, no s/s 
of distress or c/o pain at this time. All safety measures in place. Family at bedside. Will 
continue to monitor.

## 2019-11-10 VITALS — DIASTOLIC BLOOD PRESSURE: 61 MMHG | SYSTOLIC BLOOD PRESSURE: 121 MMHG

## 2019-11-10 VITALS — SYSTOLIC BLOOD PRESSURE: 127 MMHG | DIASTOLIC BLOOD PRESSURE: 57 MMHG

## 2019-11-10 VITALS — SYSTOLIC BLOOD PRESSURE: 130 MMHG | DIASTOLIC BLOOD PRESSURE: 64 MMHG

## 2019-11-10 VITALS — DIASTOLIC BLOOD PRESSURE: 70 MMHG | SYSTOLIC BLOOD PRESSURE: 131 MMHG

## 2019-11-10 VITALS — SYSTOLIC BLOOD PRESSURE: 123 MMHG | DIASTOLIC BLOOD PRESSURE: 60 MMHG

## 2019-11-10 VITALS — DIASTOLIC BLOOD PRESSURE: 57 MMHG | SYSTOLIC BLOOD PRESSURE: 127 MMHG

## 2019-11-10 VITALS — SYSTOLIC BLOOD PRESSURE: 131 MMHG | DIASTOLIC BLOOD PRESSURE: 70 MMHG

## 2019-11-10 VITALS — DIASTOLIC BLOOD PRESSURE: 66 MMHG | SYSTOLIC BLOOD PRESSURE: 130 MMHG

## 2019-11-10 RX ADMIN — Medication SCH ML: at 23:02

## 2019-11-10 RX ADMIN — Medication PRN MG: at 16:03

## 2019-11-10 RX ADMIN — Medication SCH ML: at 10:53

## 2019-11-10 RX ADMIN — Medication SCH ML: at 01:00

## 2019-11-10 RX ADMIN — TAZOBACTAM SODIUM AND PIPERACILLIN SODIUM SCH MLS/HR: 375; 3 INJECTION, SOLUTION INTRAVENOUS at 01:13

## 2019-11-10 RX ADMIN — Medication SCH ML: at 19:35

## 2019-11-10 RX ADMIN — Medication SCH ML: at 06:40

## 2019-11-10 RX ADMIN — Medication SCH ML: at 15:00

## 2019-11-10 RX ADMIN — POTASSIUM CHLORIDE SCH MEQ: 1500 TABLET, EXTENDED RELEASE ORAL at 06:11

## 2019-11-10 RX ADMIN — TAZOBACTAM SODIUM AND PIPERACILLIN SODIUM SCH MLS/HR: 375; 3 INJECTION, SOLUTION INTRAVENOUS at 14:00

## 2019-11-10 RX ADMIN — TAZOBACTAM SODIUM AND PIPERACILLIN SODIUM SCH MLS/HR: 375; 3 INJECTION, SOLUTION INTRAVENOUS at 08:00

## 2019-11-10 RX ADMIN — POTASSIUM CHLORIDE SCH MEQ: 1500 TABLET, EXTENDED RELEASE ORAL at 17:07

## 2019-11-10 RX ADMIN — Medication SCH ML: at 10:52

## 2019-11-10 RX ADMIN — TAZOBACTAM SODIUM AND PIPERACILLIN SODIUM SCH MLS/HR: 375; 3 INJECTION, SOLUTION INTRAVENOUS at 20:00

## 2019-11-10 RX ADMIN — Medication SCH ML: at 03:00

## 2019-11-10 RX ADMIN — LEVOFLOXACIN SCH MLS/HR: 5 INJECTION, SOLUTION INTRAVENOUS at 09:00

## 2019-11-10 NOTE — NUR
INITIAL ASSESSMENT COMPLETE, NO DISTRESS NOTED, RA, NO COUGHING, VS STABLE, CALL LIGHT IN 
REACH, IV INTACT, NO OTHER NEEDS

## 2019-11-10 NOTE — NUR
Visit made by the Spiritual Care Department Pastoral Visitor, Kari Haley.  Pt unavailable 
at this time. 





ANGELITO Perales

Spiritual Care Department

O: [phone]

Pager: [phone] ([phone] number calling from)

## 2019-11-11 VITALS — DIASTOLIC BLOOD PRESSURE: 67 MMHG | SYSTOLIC BLOOD PRESSURE: 118 MMHG

## 2019-11-11 VITALS — DIASTOLIC BLOOD PRESSURE: 58 MMHG | SYSTOLIC BLOOD PRESSURE: 118 MMHG

## 2019-11-11 VITALS — SYSTOLIC BLOOD PRESSURE: 124 MMHG | DIASTOLIC BLOOD PRESSURE: 70 MMHG

## 2019-11-11 VITALS — SYSTOLIC BLOOD PRESSURE: 121 MMHG | DIASTOLIC BLOOD PRESSURE: 63 MMHG

## 2019-11-11 VITALS — SYSTOLIC BLOOD PRESSURE: 131 MMHG | DIASTOLIC BLOOD PRESSURE: 68 MMHG

## 2019-11-11 VITALS — DIASTOLIC BLOOD PRESSURE: 70 MMHG | SYSTOLIC BLOOD PRESSURE: 124 MMHG

## 2019-11-11 VITALS — SYSTOLIC BLOOD PRESSURE: 120 MMHG | DIASTOLIC BLOOD PRESSURE: 64 MMHG

## 2019-11-11 LAB
ALBUMIN SERPL-MCNC: 2.2 G/DL (ref 3.5–5)
ALBUMIN/GLOB SERPL: 0.4 {RATIO} (ref 0.8–2)
ALP SERPL-CCNC: 197 IU/L (ref 40–150)
ALT SERPL-CCNC: 67 IU/L (ref 0–55)
ANION GAP SERPL CALC-SCNC: 12 MMOL/L (ref 8–16)
ANISOCYTOSIS BLD QL SMEAR: SLIGHT
BASOPHILS # BLD AUTO: 0.1 10*3/UL (ref 0–0.1)
BASOPHILS NFR BLD AUTO: 0.5 % (ref 0–1)
BUN SERPL-MCNC: 8 MG/DL (ref 7–26)
BUN/CREAT SERPL: 10 (ref 6–25)
CALCIUM SERPL-MCNC: 9.5 MG/DL (ref 8.4–10.2)
CHLORIDE SERPL-SCNC: 97 MMOL/L (ref 98–107)
CO2 SERPL-SCNC: 25 MMOL/L (ref 22–29)
DEPRECATED NEUTROPHILS # BLD AUTO: 16.2 10*3/UL (ref 2.1–6.9)
EGFRCR SERPLBLD CKD-EPI 2021: > 60 ML/MIN (ref 60–?)
EOSINOPHIL # BLD AUTO: 0.1 10*3/UL (ref 0–0.4)
EOSINOPHIL NFR BLD AUTO: 0.3 % (ref 0–6)
ERYTHROCYTE [DISTWIDTH] IN CORD BLOOD: 14.8 % (ref 11.7–14.4)
GLOBULIN PLAS-MCNC: 5.3 G/DL (ref 2.3–3.5)
GLUCOSE SERPLBLD-MCNC: 106 MG/DL (ref 74–118)
HCT VFR BLD AUTO: 36.7 % (ref 38.2–49.6)
HGB BLD-MCNC: 12.7 G/DL (ref 14–18)
LYMPHOCYTES # BLD: 2 10*3/UL (ref 1–3.2)
LYMPHOCYTES NFR BLD AUTO: 9.4 % (ref 18–39.1)
LYMPHOCYTES NFR BLD MANUAL: 12 % (ref 19–48)
MACROCYTES BLD QL SMEAR: SLIGHT
MCH RBC QN AUTO: 30 PG (ref 28–32)
MCHC RBC AUTO-ENTMCNC: 34.6 G/DL (ref 31–35)
MCV RBC AUTO: 86.8 FL (ref 81–99)
MICROCYTES BLD QL SMEAR: SLIGHT
MONOCYTES # BLD AUTO: 1.2 10*3/UL (ref 0.2–0.8)
MONOCYTES NFR BLD AUTO: 5.9 % (ref 4.4–11.3)
MONOCYTES NFR BLD MANUAL: 9 % (ref 3.4–9)
NEUTS BAND NFR BLD MANUAL: 2 %
NEUTS SEG NFR BLD AUTO: 78.6 % (ref 38.7–80)
NEUTS SEG NFR BLD MANUAL: 73 % (ref 40–74)
PLAT MORPH BLD: (no result)
PLATELET # BLD AUTO: 418 X10E3/UL (ref 140–360)
PLATELET # BLD EST: (no result) 10*3/UL
POIKILOCYTOSIS BLD QL SMEAR: SLIGHT
POTASSIUM SERPL-SCNC: 4 MMOL/L (ref 3.5–5.1)
RBC # BLD AUTO: 4.23 X10E6/UL (ref 4.3–5.7)
RBC MORPH BLD: (no result)
SODIUM SERPL-SCNC: 130 MMOL/L (ref 136–145)
STOMATOCYTES BLD QL SMEAR: SLIGHT

## 2019-11-11 RX ADMIN — Medication SCH ML: at 07:00

## 2019-11-11 RX ADMIN — POTASSIUM CHLORIDE SCH MEQ: 1500 TABLET, EXTENDED RELEASE ORAL at 06:00

## 2019-11-11 RX ADMIN — Medication SCH ML: at 02:30

## 2019-11-11 RX ADMIN — Medication SCH ML: at 14:20

## 2019-11-11 RX ADMIN — LEVOFLOXACIN SCH MLS/HR: 5 INJECTION, SOLUTION INTRAVENOUS at 08:56

## 2019-11-11 RX ADMIN — Medication SCH ML: at 19:38

## 2019-11-11 RX ADMIN — Medication PRN MG: at 07:54

## 2019-11-11 RX ADMIN — TAZOBACTAM SODIUM AND PIPERACILLIN SODIUM SCH MLS/HR: 375; 3 INJECTION, SOLUTION INTRAVENOUS at 02:00

## 2019-11-11 RX ADMIN — Medication SCH ML: at 23:00

## 2019-11-11 RX ADMIN — CEFTRIAXONE SCH MLS/HR: 100 INJECTION, POWDER, FOR SOLUTION INTRAVENOUS at 13:54

## 2019-11-11 RX ADMIN — TAZOBACTAM SODIUM AND PIPERACILLIN SODIUM SCH MLS/HR: 375; 3 INJECTION, SOLUTION INTRAVENOUS at 07:53

## 2019-11-11 RX ADMIN — POTASSIUM CHLORIDE SCH MEQ: 1500 TABLET, EXTENDED RELEASE ORAL at 17:07

## 2019-11-11 NOTE — DIAGNOSTIC IMAGING REPORT
Chest, 2 views,  11/11/2019.   

 



History: TB.



Comparison: 11/8/2019.



Findings: The cardiac silhouette is within normal limits. Large left upper lobe

cavitary lesion and patchy right upper lobe and left lower lobe opacities are

present. There are no acute osseous or soft tissue abnormalities. 



Impression: 

Bilateral pneumonia, cavitary on the left, consistent with TB.



Signed by: Hi Ruiz on 11/11/2019 12:26 PM

## 2019-11-11 NOTE — NUR
Nutrition Screen Note



RD Recommendation for Physician: 

-Continue regular diet



Plan of Care: RD following, monitoring for tolerance and adequacy



Nutrition reason for involvement: length of stay



Primary Diagnose(s): fever, pleurisy, and pneumonia



PMH: none



Ht: 65 in 

Wt:161 lb

BMI: 26.8 kg/m2

IBW: 136 lb



RD Assessment:

(11/11/19) Chart reviewed. Labs and meds reviewed. Pt is a 34 year old male admitted with 
fever, pleurisy, and pneumonia. Pt reports he has been eating about 50% of meals. Per 
documentation, pt has been consuming % of meals during admission except this morning 
in which pt consumed 25% of meal. No N/V/D/C reported and no chewing/swallowing issues at 
this time. Will continue to monitor.





Current Diet:  Regular



Malnutrition Evaluation (11/11/19)

The patient does not meet criteria for a specified degree of malnutrition at this time. Will 
re-evaluate at follow-up as appropriate. 



Diet Education Needs Assessment:

Diet education not indicated.





Nutrition Care Level: low





Signed: Kelli Carr, RD, LD

## 2019-11-11 NOTE — NUR
PATIENT IS AWAKE AND IN STABLE CONDITION WITH NO S/S OF RESPIRATORY DISTRESS. NO PAIN 
VOICED. CALL LIGHT IS WITHIN REACH, PATIENT INSTRUCTED TO CALL FOR ASSISTANCE AS NEEDED.

## 2019-11-11 NOTE — NUR
RECEDIVED PT IN BED AOX3 .DENIES PAIN .RESPIRATIONS ARE EVEN AND UNLABORED .CALL LIGHT WITH 
IN REACH .CONTINUE TO MONITOR

## 2019-11-12 VITALS — DIASTOLIC BLOOD PRESSURE: 58 MMHG | SYSTOLIC BLOOD PRESSURE: 123 MMHG

## 2019-11-12 VITALS — DIASTOLIC BLOOD PRESSURE: 58 MMHG | SYSTOLIC BLOOD PRESSURE: 130 MMHG

## 2019-11-12 VITALS — SYSTOLIC BLOOD PRESSURE: 119 MMHG | DIASTOLIC BLOOD PRESSURE: 61 MMHG

## 2019-11-12 VITALS — SYSTOLIC BLOOD PRESSURE: 112 MMHG | DIASTOLIC BLOOD PRESSURE: 59 MMHG

## 2019-11-12 VITALS — SYSTOLIC BLOOD PRESSURE: 135 MMHG | DIASTOLIC BLOOD PRESSURE: 89 MMHG

## 2019-11-12 VITALS — DIASTOLIC BLOOD PRESSURE: 58 MMHG | SYSTOLIC BLOOD PRESSURE: 132 MMHG

## 2019-11-12 LAB
ALBUMIN SERPL-MCNC: 2.3 G/DL (ref 3.5–5)
ALBUMIN/GLOB SERPL: 0.4 {RATIO} (ref 0.8–2)
ALP SERPL-CCNC: 202 IU/L (ref 40–150)
ALT SERPL-CCNC: 67 IU/L (ref 0–55)
ANION GAP SERPL CALC-SCNC: 12.1 MMOL/L (ref 8–16)
BASOPHILS # BLD AUTO: 0.1 10*3/UL (ref 0–0.1)
BASOPHILS NFR BLD AUTO: 0.5 % (ref 0–1)
BUN SERPL-MCNC: 7 MG/DL (ref 7–26)
BUN/CREAT SERPL: 8 (ref 6–25)
CALCIUM SERPL-MCNC: 9.6 MG/DL (ref 8.4–10.2)
CHLORIDE SERPL-SCNC: 97 MMOL/L (ref 98–107)
CO2 SERPL-SCNC: 25 MMOL/L (ref 22–29)
DEPRECATED NEUTROPHILS # BLD AUTO: 15.3 10*3/UL (ref 2.1–6.9)
EGFRCR SERPLBLD CKD-EPI 2021: > 60 ML/MIN (ref 60–?)
EOSINOPHIL # BLD AUTO: 0.1 10*3/UL (ref 0–0.4)
EOSINOPHIL NFR BLD AUTO: 0.4 % (ref 0–6)
ERYTHROCYTE [DISTWIDTH] IN CORD BLOOD: 14.8 % (ref 11.7–14.4)
GLOBULIN PLAS-MCNC: 5.5 G/DL (ref 2.3–3.5)
GLUCOSE SERPLBLD-MCNC: 103 MG/DL (ref 74–118)
HCT VFR BLD AUTO: 35.6 % (ref 38.2–49.6)
HGB BLD-MCNC: 12.2 G/DL (ref 14–18)
LYMPHOCYTES # BLD: 1.9 10*3/UL (ref 1–3.2)
LYMPHOCYTES NFR BLD AUTO: 9.6 % (ref 18–39.1)
MCH RBC QN AUTO: 30.2 PG (ref 28–32)
MCHC RBC AUTO-ENTMCNC: 34.3 G/DL (ref 31–35)
MCV RBC AUTO: 88.1 FL (ref 81–99)
MONOCYTES # BLD AUTO: 1.2 10*3/UL (ref 0.2–0.8)
MONOCYTES NFR BLD AUTO: 6.4 % (ref 4.4–11.3)
NEUTS SEG NFR BLD AUTO: 78.3 % (ref 38.7–80)
PLATELET # BLD AUTO: 461 X10E3/UL (ref 140–360)
POTASSIUM SERPL-SCNC: 4.1 MMOL/L (ref 3.5–5.1)
RBC # BLD AUTO: 4.04 X10E6/UL (ref 4.3–5.7)
SODIUM SERPL-SCNC: 130 MMOL/L (ref 136–145)

## 2019-11-12 RX ADMIN — Medication SCH ML: at 16:15

## 2019-11-12 RX ADMIN — Medication SCH ML: at 19:37

## 2019-11-12 RX ADMIN — Medication SCH ML: at 02:30

## 2019-11-12 RX ADMIN — Medication SCH ML: at 15:00

## 2019-11-12 RX ADMIN — CEFTRIAXONE SCH MLS/HR: 100 INJECTION, POWDER, FOR SOLUTION INTRAVENOUS at 12:33

## 2019-11-12 RX ADMIN — POTASSIUM CHLORIDE SCH MEQ: 1500 TABLET, EXTENDED RELEASE ORAL at 05:40

## 2019-11-12 RX ADMIN — Medication SCH ML: at 07:00

## 2019-11-12 RX ADMIN — Medication SCH ML: at 00:10

## 2019-11-12 RX ADMIN — Medication SCH ML: at 13:00

## 2019-11-12 RX ADMIN — POTASSIUM CHLORIDE SCH MEQ: 1500 TABLET, EXTENDED RELEASE ORAL at 17:20

## 2019-11-12 RX ADMIN — Medication SCH ML: at 11:00

## 2019-11-12 NOTE — NUR
PT RESTED DURING THE NIGHT.C/O OF COUGH .NOTIFIED DR GUILLERMO .GO THE  ORDER TO GIVE 
ROBITUSSIN DM  .CALL LIGHT WITH IN REACH .CONTINUE TO MONITOR

## 2019-11-12 NOTE — NUR
PATIENT IS AWAKE AND IN STABLE CONDITION WITH NO S/S OF RESPIRATORY DISTRESS. PATIENT DENIES 
PAIN. COUGH MEDICINE ADMINISTERED TO PATIENT BY NIGHT SHIFT NURSE. CALL LIGHT IS WITHIN 
REACH, PATIENT INSTRUCTED TO CALL FOR ASSISTANCE AS NEEDED.

## 2019-11-12 NOTE — NUR
PATIENT IS IN STABLE CONDITION WITH NO S/S OF RESPIRATORY DISTRESS- PATIENT DENIES PAIN. 
FAMILY MEMBERS PRESENT IN ROOM. CALL LIGHT IS WITHIN REACH, PATIENT INSTRUCTED TO CALL FOR 
ASSISTANCE AS NEEDED. REPORT GIVEN TO ONCOMING NURSE.

## 2019-11-13 VITALS — DIASTOLIC BLOOD PRESSURE: 58 MMHG | SYSTOLIC BLOOD PRESSURE: 122 MMHG

## 2019-11-13 VITALS — SYSTOLIC BLOOD PRESSURE: 122 MMHG | DIASTOLIC BLOOD PRESSURE: 69 MMHG

## 2019-11-13 VITALS — SYSTOLIC BLOOD PRESSURE: 120 MMHG | DIASTOLIC BLOOD PRESSURE: 63 MMHG

## 2019-11-13 VITALS — DIASTOLIC BLOOD PRESSURE: 59 MMHG | SYSTOLIC BLOOD PRESSURE: 114 MMHG

## 2019-11-13 VITALS — SYSTOLIC BLOOD PRESSURE: 117 MMHG | DIASTOLIC BLOOD PRESSURE: 57 MMHG

## 2019-11-13 VITALS — DIASTOLIC BLOOD PRESSURE: 58 MMHG | SYSTOLIC BLOOD PRESSURE: 117 MMHG

## 2019-11-13 VITALS — SYSTOLIC BLOOD PRESSURE: 114 MMHG | DIASTOLIC BLOOD PRESSURE: 59 MMHG

## 2019-11-13 LAB
ALBUMIN SERPL-MCNC: 2.4 G/DL (ref 3.5–5)
ALBUMIN/GLOB SERPL: 0.4 {RATIO} (ref 0.8–2)
ALP SERPL-CCNC: 195 IU/L (ref 40–150)
ALT SERPL-CCNC: 56 IU/L (ref 0–55)
ANION GAP SERPL CALC-SCNC: 12 MMOL/L (ref 8–16)
ANISOCYTOSIS BLD QL SMEAR: SLIGHT
BASOPHILS # BLD AUTO: 0.1 10*3/UL (ref 0–0.1)
BASOPHILS NFR BLD AUTO: 0.5 % (ref 0–1)
BUN SERPL-MCNC: 8 MG/DL (ref 7–26)
BUN/CREAT SERPL: 10 (ref 6–25)
CALCIUM SERPL-MCNC: 9.5 MG/DL (ref 8.4–10.2)
CHLORIDE SERPL-SCNC: 100 MMOL/L (ref 98–107)
CO2 SERPL-SCNC: 23 MMOL/L (ref 22–29)
DEPRECATED NEUTROPHILS # BLD AUTO: 13.9 10*3/UL (ref 2.1–6.9)
EGFRCR SERPLBLD CKD-EPI 2021: > 60 ML/MIN (ref 60–?)
EOSINOPHIL # BLD AUTO: 0.1 10*3/UL (ref 0–0.4)
EOSINOPHIL NFR BLD AUTO: 0.3 % (ref 0–6)
EOSINOPHIL NFR BLD MANUAL: 1 % (ref 0–7)
ERYTHROCYTE [DISTWIDTH] IN CORD BLOOD: 14.6 % (ref 11.7–14.4)
GLOBULIN PLAS-MCNC: 5.7 G/DL (ref 2.3–3.5)
GLUCOSE SERPLBLD-MCNC: 100 MG/DL (ref 74–118)
HCT VFR BLD AUTO: 36.6 % (ref 38.2–49.6)
HGB BLD-MCNC: 12.2 G/DL (ref 14–18)
LYMPHOCYTES # BLD: 1.7 10*3/UL (ref 1–3.2)
LYMPHOCYTES NFR BLD AUTO: 9.6 % (ref 18–39.1)
LYMPHOCYTES NFR BLD MANUAL: 12 % (ref 19–48)
MACROCYTES BLD QL SMEAR: SLIGHT
MCH RBC QN AUTO: 30 PG (ref 28–32)
MCHC RBC AUTO-ENTMCNC: 33.3 G/DL (ref 31–35)
MCV RBC AUTO: 90.1 FL (ref 81–99)
MICROCYTES BLD QL SMEAR: SLIGHT
MONOCYTES # BLD AUTO: 1.1 10*3/UL (ref 0.2–0.8)
MONOCYTES NFR BLD AUTO: 6.2 % (ref 4.4–11.3)
MONOCYTES NFR BLD MANUAL: 8 % (ref 3.4–9)
NEUTS BAND NFR BLD MANUAL: 2 %
NEUTS SEG NFR BLD AUTO: 80.3 % (ref 38.7–80)
NEUTS SEG NFR BLD MANUAL: 77 % (ref 40–74)
OVALOCYTES BLD QL SMEAR: (no result)
PLAT MORPH BLD: (no result)
PLATELET # BLD AUTO: 478 X10E3/UL (ref 140–360)
PLATELET # BLD EST: (no result) 10*3/UL
POIKILOCYTOSIS BLD QL SMEAR: SLIGHT
POTASSIUM SERPL-SCNC: 4 MMOL/L (ref 3.5–5.1)
RBC # BLD AUTO: 4.06 X10E6/UL (ref 4.3–5.7)
RBC MORPH BLD: (no result)
SODIUM SERPL-SCNC: 131 MMOL/L (ref 136–145)

## 2019-11-13 RX ADMIN — CEFTRIAXONE SCH MLS/HR: 100 INJECTION, POWDER, FOR SOLUTION INTRAVENOUS at 13:00

## 2019-11-13 RX ADMIN — POTASSIUM CHLORIDE SCH MEQ: 1500 TABLET, EXTENDED RELEASE ORAL at 18:32

## 2019-11-13 RX ADMIN — POTASSIUM CHLORIDE SCH MEQ: 1500 TABLET, EXTENDED RELEASE ORAL at 05:42

## 2019-11-13 RX ADMIN — Medication SCH ML: at 07:23

## 2019-11-13 RX ADMIN — Medication SCH ML: at 00:10

## 2019-11-13 RX ADMIN — Medication SCH ML: at 02:46

## 2019-11-13 NOTE — PROGRESS NOTE
DATE:    

 

SUBJECTIVE:  The patient did well overnight.  Coughing overall is much better.

 

OBJECTIVE:  VITAL SIGNS:  Temperature 99.5, pulse 73, blood pressure is 122/58, sats

99%. 

GENERAL:  No apparent distress.  Lying in bed. 

CARDIOVASCULAR:  Regular rate and rhythm. 

LUNGS:  Decreased breath sounds on the left. 

ABDOMEN:  Good bowel sounds.  Soft, nontender. 

EXTREMITIES:  No clubbing or cyanosis. 

NEUROLOGIC:  Nonfocal.

 

ASSESSMENT AND PLAN:  

1. Pneumonia with cavitary lesion.  Continue with current antibiotics per Infectious

Disease. 

2. Leukocytosis.  Continue to monitor.

3. Anemia.  Continue to monitor.  Please see hospital chart for full details.

 

 

 

 

______________________________

MD APOORVA Loredo/MARIA ESTHER

D:  11/13/2019 05:31:48

T:  11/13/2019 07:32:53

Job #:  [id number]

## 2019-11-14 VITALS — SYSTOLIC BLOOD PRESSURE: 125 MMHG | DIASTOLIC BLOOD PRESSURE: 58 MMHG

## 2019-11-14 VITALS — DIASTOLIC BLOOD PRESSURE: 65 MMHG | SYSTOLIC BLOOD PRESSURE: 116 MMHG

## 2019-11-14 VITALS — DIASTOLIC BLOOD PRESSURE: 58 MMHG | SYSTOLIC BLOOD PRESSURE: 125 MMHG

## 2019-11-14 VITALS — SYSTOLIC BLOOD PRESSURE: 112 MMHG | DIASTOLIC BLOOD PRESSURE: 56 MMHG

## 2019-11-14 VITALS — DIASTOLIC BLOOD PRESSURE: 72 MMHG | SYSTOLIC BLOOD PRESSURE: 124 MMHG

## 2019-11-14 VITALS — SYSTOLIC BLOOD PRESSURE: 119 MMHG | DIASTOLIC BLOOD PRESSURE: 56 MMHG

## 2019-11-14 VITALS — DIASTOLIC BLOOD PRESSURE: 56 MMHG | SYSTOLIC BLOOD PRESSURE: 112 MMHG

## 2019-11-14 RX ADMIN — CEFTRIAXONE SCH MLS/HR: 100 INJECTION, POWDER, FOR SOLUTION INTRAVENOUS at 13:31

## 2019-11-14 RX ADMIN — POTASSIUM CHLORIDE SCH MEQ: 1500 TABLET, EXTENDED RELEASE ORAL at 05:33

## 2019-11-14 RX ADMIN — POTASSIUM CHLORIDE SCH MEQ: 1500 TABLET, EXTENDED RELEASE ORAL at 17:00

## 2019-11-14 NOTE — NUR
WALKING ROUNDS PERFORMED, RECEIVED PT LAYING SEMI FOWLERS IN BED, AAOX3, RR EVEN AND 
NON-LABORED, ON ROOM AIR. NO S/SX OF DISTRESS NOTED. LEFT PT LAYING SEMI FOWLERS IN BED, BED 
IN LOW LOCKED POSITION, SIDE RAILS UPX2, CALL LIGHT AND PHONE WITHIN REACH.

## 2019-11-14 NOTE — PROGRESS NOTE
DATE:    

 

SUBJECTIVE:  No new complaints overnight.

 

OBJECTIVE:  VITAL SIGNS:  Stable.  Still has low-grade temperatures, it is 99.8, pulse

72, blood pressure 117/58, and saturations 97%. 

GENERAL:  No apparent distress. 

CARDIOVASCULAR:  Regular rate and rhythm. 

LUNGS:  Decreased breath sounds on the left with some rhonchi.  Right side is clear. 

ABDOMEN:  Good bowel sounds.  Soft and nontender. 

EXTREMITIES:  No clubbing or cyanosis. 

NEUROLOGIC:  Nonfocal.

 

ASSESSMENT AND PLAN:  

1. Pneumonia.  Continue with current care.  TB spot was negative.  AFB cultures were

negative. 

2. Leukocytosis.  Continue to monitor.

3. Anemia.  Continue to monitor.

4. Thrombocythemia, most likely reactive.  Continue to monitor.  Please see hospital

chart for full details. 

 

 

 

 

______________________________

MD APOORVA Loredo/MARIA ESTHER

D:  11/14/2019 05:32:34

T:  11/14/2019 11:08:12

Job #:  [id number]

## 2019-11-14 NOTE — DIAGNOSTIC IMAGING REPORT
EXAMINATION:  CHEST 2 VIEWS    



INDICATION: Pneumonia



COMPARISON: Multiple prior chest radiograph, most recently of 11/11/2019

     

FINDINGS:



LINES/TUBES:None



LUNGS:The lungs are moderately inflated. Again seen and essentially unchanged

is the large cavitary lesion at the left lateral midlung. Patchy and nodular

opacities in the right midlung also appear unchanged.



PLEURA:No pleural effusion or pneumothorax.



MEDIASTINUM:The cardiomediastinal silhouette appears unchanged in size and

shape.



BONES/SOFT TISSUES:No acute osseous injury.



ABDOMEN:No free air under the diaphragm.





IMPRESSION: 

No significant interval change in left midlung cavitary lesion and smaller

nodular and patchy opacities at the right midlung. 



Signed by: Miryam Chinchilla MD on 11/14/2019 10:19 AM

## 2019-11-15 VITALS — DIASTOLIC BLOOD PRESSURE: 56 MMHG | SYSTOLIC BLOOD PRESSURE: 115 MMHG

## 2019-11-15 VITALS — SYSTOLIC BLOOD PRESSURE: 102 MMHG | DIASTOLIC BLOOD PRESSURE: 51 MMHG

## 2019-11-15 VITALS — DIASTOLIC BLOOD PRESSURE: 60 MMHG | SYSTOLIC BLOOD PRESSURE: 117 MMHG

## 2019-11-15 VITALS — SYSTOLIC BLOOD PRESSURE: 117 MMHG | DIASTOLIC BLOOD PRESSURE: 60 MMHG

## 2019-11-15 LAB
BASOPHILS # BLD AUTO: 0.1 10*3/UL (ref 0–0.1)
BASOPHILS NFR BLD AUTO: 0.7 % (ref 0–1)
DEPRECATED NEUTROPHILS # BLD AUTO: 9.4 10*3/UL (ref 2.1–6.9)
EOSINOPHIL # BLD AUTO: 0.1 10*3/UL (ref 0–0.4)
EOSINOPHIL NFR BLD AUTO: 0.6 % (ref 0–6)
ERYTHROCYTE [DISTWIDTH] IN CORD BLOOD: 14.4 % (ref 11.7–14.4)
HCT VFR BLD AUTO: 36.9 % (ref 38.2–49.6)
HGB BLD-MCNC: 12.5 G/DL (ref 14–18)
LYMPHOCYTES # BLD: 1.5 10*3/UL (ref 1–3.2)
LYMPHOCYTES NFR BLD AUTO: 12.2 % (ref 18–39.1)
MCH RBC QN AUTO: 30 PG (ref 28–32)
MCHC RBC AUTO-ENTMCNC: 33.9 G/DL (ref 31–35)
MCV RBC AUTO: 88.5 FL (ref 81–99)
MONOCYTES # BLD AUTO: 0.9 10*3/UL (ref 0.2–0.8)
MONOCYTES NFR BLD AUTO: 7.5 % (ref 4.4–11.3)
NEUTS SEG NFR BLD AUTO: 77.4 % (ref 38.7–80)
PLATELET # BLD AUTO: 552 X10E3/UL (ref 140–360)
RBC # BLD AUTO: 4.17 X10E6/UL (ref 4.3–5.7)

## 2019-11-15 RX ADMIN — POTASSIUM CHLORIDE SCH MEQ: 1500 TABLET, EXTENDED RELEASE ORAL at 05:38

## 2019-11-15 NOTE — DISCHARGE SUMMARY
DISCHARGE DIAGNOSES:  

1. Streptococcus pneumoniae with cavitary lesion.

2. Leukocytosis.

 

HISTORY OF PRESENT ILLNESS AND HOSPITAL COURSE:  See hospital chart for full details.

The patient is young gentleman, who presented with pneumonia symptoms that was cavitary

on his x-ray and CT scan.  He was placed in isolation, placed on broad-spectrum

antibiotics and was seen by Pulmonary, Infectious Disease.  He did have a bronch done

that showed negative washings for AFB and still grew out strep pneumoniae.  His TB-spot

test was also negative.  His fever did resolve.  His leukocytosis had maintained, that

was improved.  At the time of discharge, the patient was doing very well and he wanted

to go home, so he was discharged home with p.o. Augmentin for an extended period of time

and told to follow up with both Dr. Hoover and Dr. Walker, as well as his primary care

physician in 2-3 weeks.  Please see hospital chart for full details. 

 

 

 

 

______________________________

MD APOORVA Loredo/MARIA ESTHER

D:  11/15/2019 05:06:17

T:  11/15/2019 06:38:31

Job #:  [id number]

## 2019-11-15 NOTE — NUR
Visit made by the Spiritual Care Department Pastoral Visitor, Karmen Crawford. PV provided 
pastoral presence, prayer, hospitality, communion, and supportive listening. 

Pastoral Visitor informed pt/family of the scope of Chaplaincy Services and availability.



CHRIS CAGE

[facility]

Spiritual Care Department

O: [phone]

Pager: [phone] ([phone] number calling from)


-------------------------------------------------------------------------------

Addendum: 11/18/19 at 1237 by Chris Cage CHAP

-------------------------------------------------------------------------------

CORRECTION: ABOVE VISIT TOOK PLACE ON 11/15/19.

## 2019-11-15 NOTE — NUR
PATIENT IN BED WITH HEAD OF BED ELEVATED TALKING ON THE PHONE, NO RESPIRATORY DISTRESS 
NOTED. BED IN LOWER POSITION, CALL LIGHT AT REACH.

## 2019-11-15 NOTE — NUR
PATIENT DISCHARGED HOME. DISCHARGE INSTRUCTIONS, PRESCRIPTION, AND FOLLOW UP GIVEN TO 
PATIENT, HE VERBALIZED UNDERSTANDING. IV TO LEFT WRIST REMOVED WITH TIP INTACT. ALL PERSONAL 
ITEM TAKEN WITH PATIENT. LEFT UNIT PER WHEEL CHAIR TO FRONT LOBBY IN STABLE  CONDITION.

## 2020-05-19 ENCOUNTER — HOSPITAL ENCOUNTER (INPATIENT)
Dept: HOSPITAL 88 - ER | Age: 35
LOS: 2 days | Discharge: HOME | DRG: 683 | End: 2020-05-21
Attending: INTERNAL MEDICINE | Admitting: INTERNAL MEDICINE
Payer: SELF-PAY

## 2020-05-19 VITALS — WEIGHT: 172.5 LBS | BODY MASS INDEX: 27.72 KG/M2 | HEIGHT: 66 IN

## 2020-05-19 DIAGNOSIS — T67.5XXA: ICD-10-CM

## 2020-05-19 DIAGNOSIS — N17.9: Primary | ICD-10-CM

## 2020-05-19 DIAGNOSIS — N30.90: ICD-10-CM

## 2020-05-19 DIAGNOSIS — R10.31: ICD-10-CM

## 2020-05-19 DIAGNOSIS — M62.82: ICD-10-CM

## 2020-05-19 LAB
ALBUMIN SERPL-MCNC: 5.4 G/DL (ref 3.5–5)
ALBUMIN/GLOB SERPL: 1 {RATIO} (ref 0.8–2)
ALP SERPL-CCNC: 68 IU/L (ref 40–150)
ALT SERPL-CCNC: 20 IU/L (ref 0–55)
AMPHETAMINES UR QL SCN>1000 NG/ML: NEGATIVE
ANION GAP SERPL CALC-SCNC: 23.7 MMOL/L (ref 8–16)
BACTERIA URNS QL MICRO: (no result) /HPF
BASOPHILS # BLD AUTO: 0.1 10*3/UL (ref 0–0.1)
BASOPHILS NFR BLD AUTO: 0.6 % (ref 0–1)
BENZODIAZ UR QL SCN: NEGATIVE
BILIRUB UR QL: (no result)
BUN SERPL-MCNC: 14 MG/DL (ref 7–26)
BUN/CREAT SERPL: 4 (ref 6–25)
CALCIUM SERPL-MCNC: 11 MG/DL (ref 8.4–10.2)
CHLORIDE SERPL-SCNC: 92 MMOL/L (ref 98–107)
CK MB SERPL-MCNC: 2.8 NG/ML (ref 0–5)
CK SERPL-CCNC: 280 IU/L (ref 30–200)
CLARITY UR: (no result)
CO2 SERPL-SCNC: 24 MMOL/L (ref 22–29)
COLOR UR: (no result)
DEPRECATED NEUTROPHILS # BLD AUTO: 8.1 10*3/UL (ref 2.1–6.9)
DEPRECATED RBC URNS MANUAL-ACNC: (no result) /HPF (ref 0–5)
EGFRCR SERPLBLD CKD-EPI 2021: 19 ML/MIN (ref 60–?)
EOSINOPHIL # BLD AUTO: 0.3 10*3/UL (ref 0–0.4)
EOSINOPHIL NFR BLD AUTO: 2.6 % (ref 0–6)
EPI CELLS URNS QL MICRO: (no result) /LPF
ERYTHROCYTE [DISTWIDTH] IN CORD BLOOD: 13.9 % (ref 11.7–14.4)
GLOBULIN PLAS-MCNC: 5.4 G/DL (ref 2.3–3.5)
GLUCOSE SERPLBLD-MCNC: 144 MG/DL (ref 74–118)
HCT VFR BLD AUTO: 48.4 % (ref 38.2–49.6)
HGB BLD-MCNC: 16.3 G/DL (ref 14–18)
HYALINE CASTS #/AREA URNS LPF: (no result) /[LPF] (ref 0–1)
KETONES UR QL STRIP.AUTO: (no result)
LEUKOCYTE ESTERASE UR QL STRIP.AUTO: NEGATIVE
LYMPHOCYTES # BLD: 0.9 10*3/UL (ref 1–3.2)
LYMPHOCYTES NFR BLD AUTO: 9.2 % (ref 18–39.1)
MCH RBC QN AUTO: 29 PG (ref 28–32)
MCHC RBC AUTO-ENTMCNC: 33.7 G/DL (ref 31–35)
MCV RBC AUTO: 86 FL (ref 81–99)
MONOCYTES # BLD AUTO: 0.8 10*3/UL (ref 0.2–0.8)
MONOCYTES NFR BLD AUTO: 7.8 % (ref 4.4–11.3)
NEUTS SEG NFR BLD AUTO: 79.4 % (ref 38.7–80)
NITRITE UR QL STRIP.AUTO: POSITIVE
NON-SQ EPI CELLS URNS QL MICRO: (no result)
PCP UR QL SCN: NEGATIVE
PLATELET # BLD AUTO: 271 X10E3/UL (ref 140–360)
POTASSIUM SERPL-SCNC: 4.7 MMOL/L (ref 3.5–5.1)
PROT UR QL STRIP.AUTO: >=300
RBC # BLD AUTO: 5.63 X10E6/UL (ref 4.3–5.7)
RENAL EPI CELLS URNS QL MICRO: (no result)
SODIUM SERPL-SCNC: 135 MMOL/L (ref 136–145)
SP GR UR STRIP: 1.03 (ref 1.01–1.02)
UROBILINOGEN UR STRIP-MCNC: 0.2 MG/DL (ref 0.2–1)
WBC #/AREA URNS HPF: (no result) /HPF (ref 0–5)

## 2020-05-19 PROCEDURE — 36415 COLL VENOUS BLD VENIPUNCTURE: CPT

## 2020-05-19 PROCEDURE — 83690 ASSAY OF LIPASE: CPT

## 2020-05-19 PROCEDURE — 74176 CT ABD & PELVIS W/O CONTRAST: CPT

## 2020-05-19 PROCEDURE — 82550 ASSAY OF CK (CPK): CPT

## 2020-05-19 PROCEDURE — 96374 THER/PROPH/DIAG INJ IV PUSH: CPT

## 2020-05-19 PROCEDURE — 82553 CREATINE MB FRACTION: CPT

## 2020-05-19 PROCEDURE — 93005 ELECTROCARDIOGRAM TRACING: CPT

## 2020-05-19 PROCEDURE — 81001 URINALYSIS AUTO W/SCOPE: CPT

## 2020-05-19 PROCEDURE — 99284 EMERGENCY DEPT VISIT MOD MDM: CPT

## 2020-05-19 PROCEDURE — 80048 BASIC METABOLIC PNL TOTAL CA: CPT

## 2020-05-19 PROCEDURE — 80053 COMPREHEN METABOLIC PANEL: CPT

## 2020-05-19 PROCEDURE — 85025 COMPLETE CBC W/AUTO DIFF WBC: CPT

## 2020-05-19 PROCEDURE — 87635 SARS-COV-2 COVID-19 AMP PRB: CPT

## 2020-05-19 PROCEDURE — 84484 ASSAY OF TROPONIN QUANT: CPT

## 2020-05-19 PROCEDURE — 80307 DRUG TEST PRSMV CHEM ANLYZR: CPT

## 2020-05-19 NOTE — XMS REPORT
Patient Summary Document

                             Created on: 2020



VIELKATUROB OSMAN

External Reference #: [id number]

: 1985

Sex: Male



Demographics





                          Address                   [address] [place]  [address]

 

                          Home Phone                [phone]

 

                          Preferred Language        Unknown

 

                          Marital Status            Unknown

 

                          Mandaeism Affiliation     Unknown

 

                          Race                      Unknown

 

                          Additional Race(s)        Other



 

                          Ethnic Group              Unknown





Author





                          Author                    [facility]

t

 

                          Organization              [facility]

 

                          Address                   [address]

[place]  [address]



 

                          Phone                     Unavailable







Support





                Name            Relationship    Address         Phone

 

                    ERICA REYNOLDS    Caregiver           [address]

[address]

[place]  [address] [phone]

 

                NO,  FAMILY     Caregiver       Unknown         Unavailable

 

                NO,  PCP        Caregiver       Unknown         Unavailable

 

                    EAGLE LUZ,  JETT   Caregiver           [address]

[place], [place]  [id number] [phone]

 

                    JETT GUILLERMO MD   Caregiver           [address]

[place]  [address]                     [phone]

 

                    INÉS AZUL  Next Of Kin         [address] [place]  [address] [phone]







Care Team Providers





                    Care Team Member Name Role                Phone

 

                    NO,  PCP            PCP                 Unavailable

 

                    JETT GUILLERMO      Attphys             Unavailable

 

                    JETT GUILLERMO      Admphys             Unavailable







Problems





           Condition Name Condition Details Condition Category Status     Onset 

Date Resolution

Date            Last Treatment Date Treating Clinician Comments        Source

 

       Fever  Fever  Problem Active                                    [facility]

 

       Hypokalemia Hypokalemia Problem Active                                   

 [facility]

 

       Pleurisy Pleurisy Problem Active                                    [facility]

 

       Pneumonia Pneumonia Problem Active                                    [facility]







Allergies, Adverse Reactions, Alerts

This patient has no known allergies or adverse reactions.



Medications





             Ordered Medication Name Filled Medication Name Start Date   Stop Da

te    Current 

Medication? Ordering Clinician Indication Dosage     Frequency  Signature (SIG) 

Comments                  Components                Source

 

                          Amoxicillin/Potassium Clav (Augmentin 875-125 Tablet) 

1 Each Tablet 

Amoxicillin/Potassium Clav (Augmentin 875-125 Tablet) 1 Each Tablet             

          Yes                              

875                       Every 12 Hours                           [facility]







Procedures





                Procedure       Date / Time Performed Performing Clinician [facility]

e

 

                X-ray of chest, two views 2019 00:00:00 SILVANA LEAL CH

[facility]

 

                X-ray of chest, two views 2019 00:00:00 SILVANA LEAL    

I [facility]

 

                Bronchoscopy with biopsy 2019 00:00:00 LORENE DAVIS 

[facility]

 

                X-ray of chest, two views 2019 00:00:00 ERICA REYNOLDS 

I [facility]

 

                Computed tomography of chest with contrast 2019 00:00:00 KEYSHA

ERICA BARR 

[facility]







Encounters





             Start Date/Time End Date/Time Encounter Type Admission Type [facility]   Care Department Encounter ID    Source

 

           2019 14:01:00 2019-11-15 10:27:00 Discharged Inpatient 1       

   JETT GUILLERMO 

[facility]              [id number]        [facility]







Results





           Test Description Test Time  Test Comments Results    Result Comments 

Source

 

                    White Blood Count   2019-11-15 05:57:00   

 

                                        Test Item

 

             White Blood Count (test code = 6690-2) 12.15        4.8-10.8       

            





[facility] 05:57:00* 



             Test Item    Value        Reference Range Interpretation Comments

 

             Red Blood Count (test code = 789-8) 4.17         4.3-5.7           

         





[facility] CenterHemoglobin2019-11-[facility] 05:57:00* 



             Test Item    Value        Reference Range Interpretation Comments

 

             Hemoglobin (test code = 67986-1) 12.5         14.0-18.0            

      





[facility] CenterHematocrit2019-11-15 05:57:00* 



             Test Item    Value        Reference Range Interpretation Comments

 

             Hematocrit (test code = 4544-3) 36.9         38.2-49.6             

     





[facility] CenterMean Corpuscular Volume2019-11-15 
05:57:00* 



             Test Item    Value        Reference Range Interpretation Comments

 

             Mean Corpuscular Volume (test code = 787-2) 88.5         81-99     

                 





[facility] CenterMean Corpuscular Hemoglobin2019-11-15 
05:57:00* 



             Test Item    Value        Reference Range Interpretation Comments

 

             Mean Corpuscular Hemoglobin (test code = 785-6) 30.0         28-32 

                     





[facility] CenterMean Corpuscular Hemoglobin Concent
2019-11-15 05:57:00* 



             Test Item    Value        Reference Range Interpretation Comments

 

             Mean Corpuscular Hemoglobin Concent (test code = 786-4) 33.9       

  31-35                      





[facility] CenterRed Cell Distribution Width2019-11-15 
05:57:00* 



             Test Item    Value        Reference Range Interpretation Comments

 

             Red Cell Distribution Width (test code = 97043-3) 14.4         11.7

-14.4                  





[facility] CenterPlatelet Count2019-11-15 05:57:00* 



             Test Item    Value        Reference Range Interpretation Comments

 

             Platelet Count (test code = 777-3) 552          140-360            

        





[facility] CenterNeutrophils (%) (Auto)2019-11-15 05:57:00
  * 



             Test Item    Value        Reference Range Interpretation Comments

 

             Neutrophils (%) (Auto) (test code = 64076-8) 77.4         38.7-80.0

                  





[facility] CenterLymphocytes (%) (Auto)2019-11-15 05:57:00
  * 



             Test Item    Value        Reference Range Interpretation Comments

 

             Lymphocytes (%) (Auto) (test code = 736-9) 12.2         18.0-39.1  

                





[facility] CenterMonocytes (%) (Auto)2019-11-15 05:57:00* 



             Test Item    Value        Reference Range Interpretation Comments

 

             Monocytes (%) (Auto) (test code = 5905-5) 7.5          4.4-11.3    

               





[facility] CenterEosinophils (%) (Auto)2019-11-15 05:57:00
  * 



             Test Item    Value        Reference Range Interpretation Comments

 

             Eosinophils (%) (Auto) (test code = 713-8) 0.6          0.0-6.0    

                





[facility] CenterBasophils (%) (Auto)2019-11-15 05:57:00* 



             Test Item    Value        Reference Range Interpretation Comments

 

             Basophils (%) (Auto) (test code = 706-2) 0.7          0.0-1.0      

              





[facility] CenterIM GRANULOCYTES %2019-11-15 05:57:00* 



             Test Item    Value        Reference Range Interpretation Comments

 

             IM GRANULOCYTES % (test code = IM GRANULOCYTES %) 1.6          0.0-

1.0                    





[facility] CenterNeutrophils # (Auto)2019-11-15 05:57:00* 



             Test Item    Value        Reference Range Interpretation Comments

 

             Neutrophils # (Auto) (test code = 751-8) 9.4          2.1-6.9      

              





[facility] CenterLymphocytes # (Auto)2019-11-15 05:57:00* 



             Test Item    Value        Reference Range Interpretation Comments

 

             Lymphocytes # (Auto) (test code = 77930-5) 1.5          1.0-3.2    

                





[facility] CenterMonocytes # (Auto)2019-11-15 05:57:00* 



             Test Item    Value        Reference Range Interpretation Comments

 

             Monocytes # (Auto) (test code = 742-7) 0.9          0.2-0.8        

            





[facility] CenterEosinophils # (Auto)2019-11-15 05:57:00* 



             Test Item    Value        Reference Range Interpretation Comments

 

             Eosinophils # (Auto) (test code = 711-2) 0.1          0.0-0.4      

              





[facility] CenterBasophils # (Auto)2019-11-15 05:57:00* 



             Test Item    Value        Reference Range Interpretation Comments

 

             Basophils # (Auto) (test code = 704-7) 0.1          0.0-0.1        

            





[facility] CenterAbsolute Immature Granulocyte (auto
2019-11-15 05:57:00* 



             Test Item    Value        Reference Range Interpretation Comments

 

                                        Absolute Immature Granulocyte (auto (damien

t code = Absolute Immature Granulocyte 

(auto)          0.19            0-0.1                            





[facility] CenterCHEST 2 [id number] 10:15:00         
                                                                            [place] [address]      Patient Name: ROBERT VORA      
                            MR #: [id number]                     : 1985
                                  Age/Sex: 34/M  Acct #: [id number]           
                  Req #: [id number]  Adm Physician: JETT GUILLERMO MD             
                        Ordered by: SILVANA LEAL MD                            
Report #: [id number]        Location: [facility]                               
Room/Bed: 299               _____________________________________________
______________________________________________________    Procedure: [id number] D
X/CHEST 2 VIEWS  Exam Date: 19                            Exam Time: 0950 
                                            REPORT STATUS: Signed    EXAMINATIO
N:  CHEST 2 VIEWS          INDICATION: Pneumonia      COMPARISON: Multiple prior
chest radiograph, most recently of 2019           FINDINGS:      LINES/TU
BES:None      LUNGS:The lungs are moderately inflated. Again seen and essentiall
y unchanged   is the large cavitary lesion at the left lateral midlung. Patchy a
nd nodular   opacities in the right midlung also appear unchanged.      PLEURA:N
o pleural effusion or pneumothorax.      MEDIASTINUM:The cardiomediastinal silho
uette appears unchanged in size and   shape.      BONES/SOFT TISSUES:No acute os
seous injury.      ABDOMEN:No free air under the diaphragm.         IMPRESSION: 
  No significant interval change in left midlung cavitary lesion and smaller   
nodular and patchy opacities at the right midlung.       Signed by: Stacy Benton MD on 2019 10:19 AM        Dictated By: STACY BENTON MD  Electronically Sign
ed By: STACY BENTON MD on 19 1019  Transcribed By: HI on 19 1019 
     COPY TO:   SILVANA LEAL MD         Blood Ayuzlep6022-27-97 09:09:00* 



             Test Item    Value        Reference Range Interpretation Comments

 

                          Blood Culture (test code = 68654029)                  

           NO GROWTH AFTER

72 HOURS                                                     





[facility] CenterTB Test (T-Spot)2019 16:15:00* 



             Test Item    Value        Reference Range Interpretation Comments

 

             TB Test (T-Spot) (test code = TB Test (T-Spot)) 0                  

                     





T-SPOT.TB   Negative   Normal Value: NegativePanel A Spot Count (Corrected for N
egative Control) 0Panel B Spot Count (Corrected for Negative Control) 0Negavtive
Control  PassedPositive Conrol    PassedDell Children's Medical Center
Differential Total Cells Dgznuca7532-42-51 07:53:00* 



             Test Item    Value        Reference Range Interpretation Comments

 

                          Differential Total Cells Counted (test code = Jonnie briggs Total Cells Counted) 

100                                                          





[facility] CenterNeutrophils % (Manual)2019 07:53:00
  * 



             Test Item    Value        Reference Range Interpretation Comments

 

             Neutrophils % (Manual) (test code = 14891-9) 77           40-74    

                  





[facility] CenterBand Neutrophils %2019 07:53:00* 



             Test Item    Value        Reference Range Interpretation Comments

 

             Band Neutrophils % (test code = 764-1) 2                           

            





[facility] CenterLymphocytes % (Manual)2019 07:53:00
  * 



             Test Item    Value        Reference Range Interpretation Comments

 

             Lymphocytes % (Manual) (test code = 737-7) 12           19-48      

                





[facility] CenterMonocytes % (Manual)2019 07:53:00* 



             Test Item    Value        Reference Range Interpretation Comments

 

             Monocytes % (Manual) (test code = 744-3) 8            3.4-9.0      

              





[facility] CenterEosinophils % (Manual)2019 07:53:00
  * 



             Test Item    Value        Reference Range Interpretation Comments

 

             Eosinophils % (Manual) (test code = 714-6) 1            0-7        

                





[facility] CenterPlatelet Yslufvqi9891-03-87 07:53:00* 



             Test Item    Value        Reference Range Interpretation Comments

 

             Platelet Estimate (test code = 18326-5) SLIGHTLY INCREASED         

                   





[facility] CenterPlatelet Morphology Dsmezbe4965-44-64 
07:53:00* 



             Test Item    Value        Reference Range Interpretation Comments

 

             Platelet Morphology Comment (test code = 73825-3) FEW LARGE        

                       





[facility] CenterPoikilocytosis2019-11-13 07:53:00* 



             Test Item    Value        Reference Range Interpretation Comments

 

             Poikilocytosis (test code = 779-9) SLIGHT                          

        





[facility] CenterAnisocytosis2019-11-13 07:53:00* 



             Test Item    Value        Reference Range Interpretation Comments

 

             Anisocytosis (test code = 702-1) SLIGHT                            

      





[facility] CenterMicrocytosis2019-11-13 07:53:00* 



             Test Item    Value        Reference Range Interpretation Comments

 

             Microcytosis (test code = 741-9) SLIGHT                            

      





[facility] CenterMacrocytosis2019-11-13 07:53:00* 



             Test Item    Value        Reference Range Interpretation Comments

 

             Macrocytosis (test code = 738-5) SLIGHT                            

      





[facility] CenterOvalocytes2019-11-13 07:53:00* 



             Test Item    Value        Reference Range Interpretation Comments

 

             Ovalocytes (test code = 774-0) FEW                                 

    





[facility] CenterRed Cell Morphology Dyhnujk7284-42-98 
07:53:00* 



             Test Item    Value        Reference Range Interpretation Comments

 

             Red Cell Morphology Comment (test code = 6742-1) ABNORMAL          

                      





[facility] Centerodium Hjbkp9703-23-04 06:20:00* 



             Test Item    Value        Reference Range Interpretation Comments

 

             Sodium Level (test code = 2951-2) 131          136-145             

       





[facility] CenterPotassium Kgubv5952-44-12 06:20:00* 



             Test Item    Value        Reference Range Interpretation Comments

 

             Potassium Level (test code = 2823-3) 4.0          3.5-5.1          

          





[facility] CenterChloride Zzhpd5613-78-74 06:20:00* 



             Test Item    Value        Reference Range Interpretation Comments

 

             Chloride Level (test code = 2075-0) 100                      

         





[facility] CenterCarbon Dioxide Yimps0814-43-49 06:20:00* 



             Test Item    Value        Reference Range Interpretation Comments

 

             Carbon Dioxide Level (test code = 2028-9) 23           22-29       

               





[facility] CenterAnion Tux1725-07-01 06:20:00* 



             Test Item    Value        Reference Range Interpretation Comments

 

             Anion Gap (test code = 33037-3) 12.0         8-16                  

     





[facility] CenterBlood Urea Zcvrlowz4294-35-22 06:20:00* 



             Test Item    Value        Reference Range Interpretation Comments

 

             Blood Urea Nitrogen (test code = 3094-0) 8            7-26         

              





[facility] CenterCreatinine2019-11-13 06:20:00* 



             Test Item    Value        Reference Range Interpretation Comments

 

             Creatinine (test code = 2160-0) 0.78         0.72-1.25             

     





[facility] CenterBUN/Creatinine Vuaxc6674-39-67 06:20:00* 



             Test Item    Value        Reference Range Interpretation Comments

 

             BUN/Creatinine Ratio (test code = 3097-3) 10           6-25        

               





[facility] CenterEstimat Glomerular Filtration Rate
2019 06:20:00* 



             Test Item    Value        Reference Range Interpretation Comments

 

             Estimat Glomerular Filtration Rate (test code = 480959818) > 60    

     >60                        





Ranges were taken from the National Kidney Disease Education Program and the [facility] Kidney Foundation literature.Reference ranges:60 or greater: Itlfvb03-92 (
for 3 consecutive months): Chronic kidney disease 15 or less: Kidney failureCHI 
[facility] GreenvilleGlucose Xrnec9013-62-20 06:20:00* 



             Test Item    Value        Reference Range Interpretation Comments

 

             Glucose Level (test code = FWR8525) 100                      

         





[facility] CenterCalcium Ovsuv0187-77-35 06:20:00* 



             Test Item    Value        Reference Range Interpretation Comments

 

             Calcium Level (test code = 89721-2) 9.5          8.4-10.2          

         





[facility] CenterTotal Osqvbuank1410-74-55 06:20:00* 



             Test Item    Value        Reference Range Interpretation Comments

 

             Total Bilirubin (test code = 1975-2) 0.5          0.2-1.2          

          





[facility] CenterAspartate Amino Transf (AST/SGOT)
2019 06:20:00* 



             Test Item    Value        Reference Range Interpretation Comments

 

                                        Aspartate Amino Transf (AST/SGOT) (test 

code = Aspartate Amino Transf 

(AST/SGOT))     27              5-34                             





[facility] CenterAlanine Aminotransferase (ALT/SGPT)
2019 06:20:00* 



             Test Item    Value        Reference Range Interpretation Comments

 

             Alanine Aminotransferase (ALT/SGPT) (test code = 1742-6) 56        

   0-55                       





[facility] CenterTotal Uitkwgz8034-66-55 06:20:00* 



             Test Item    Value        Reference Range Interpretation Comments

 

             Total Protein (test code = 2885-2) 8.1          6.5-8.1            

        





[facility] CenterAlbumin2019-11-13 06:20:00* 



             Test Item    Value        Reference Range Interpretation Comments

 

             Albumin (test code = 1751-7) 2.4          3.5-5.0                  

  





[facility] CenterGlobulin2019-11-13 06:20:00* 



             Test Item    Value        Reference Range Interpretation Comments

 

             Globulin (test code = 53670-7) 5.7          2.3-3.5                

    





[facility] CenterAlbumin/Globulin Jmoqp0502-08-18 06:20:00
  * 



             Test Item    Value        Reference Range Interpretation Comments

 

             Albumin/Globulin Ratio (test code = 1759-0) 0.4          0.8-2.0   

                 





[facility] CenterAlkaline Dxhqsxjmrot2638-38-52 06:20:00* 



             Test Item    Value        Reference Range Interpretation Comments

 

             Alkaline Phosphatase (test code = 6768-6) 195                

               





[facility] CenterCHEST 2 [id number] 12:22:00         
                                                                            [facility]                        [address] [place] [address]      Patient Name: ROBERT VORA      
                            MR #: [id number]                     : 1985
                                  Age/Sex: 34/M  Acct #: [id number]           
                  Req #: [id number]  Adm Physician: JETT GUILLERMO MD             
                        Ordered by: SILVANA LEAL MD                            
Report #: [id number]        Location: [facility]                               
Room/Bed: [facility]               _____________________________________________
______________________________________________________    Procedure: [id number] D
X/CHEST 2 VIEWS  Exam Date:                             Exam Time:              
                                REPORT STATUS: Signed    Chest, 2 views,  .             History: TB.      Comparison: 2019.      Findings: The c
ardiac silhouette is within normal limits. Large left upper lobe   cavitary lesi
on and patchy right upper lobe and left lower lobe opacities are   present. Ther
e are no acute osseous or soft tissue abnormalities.       Impression:    Bilate
ral pneumonia, cavitary on the left, consistent with TB.      Signed by: Hi Ruiz on 2019 12:26 PM        Dictated By: HI RUIZ MD  Electronical
ly Signed By: HI RUIZ MD on 19 1226  Transcribed By: HI on  1226       COPY TO:   SILVANA LEAL MD         Reactive Kiriaumxoye5515-76-40
07:32:00* 



             Test Item    Value        Reference Range Interpretation Comments

 

             Reactive Lymphocytes (test code = 12364-6) 4                       

                





[facility] Centertomatocytes2019-11-11 07:32:00* 



             Test Item    Value        Reference Range Interpretation Comments

 

             Stomatocytes (test code = 93146-3) SLIGHT                          

        





[facility] CenterCHES SINGLE (PORTABLE)2019 
13:00:00                                                                        
             [place] [address]      Patient Name: ROBERT VORA                                   MR #: [id number]                     
: 1985                                   Age/Sex: 34/M  Acct #: 
[id number]                              Req #: [id number]  Adm Physician: 
JETT GUILLERMO MD                                      Ordered by: LORENE DAVIS MD                            Report #: [id number]        Location: 
[facility]                               Room/Bed: [facility]               
_________________________________________
__________________________________________________________    Procedure: 1108-00
31 DX/CHEST SINGLE (PORTABLE)  Exam Date: 19                            Ex
am Time: 1200                                              REPORT STATUS: Signed
   EXAMINATION:  CHEST SINGLE (PORTABLE)          INDICATION: Pneumonia      CO
MPARISON: Chest radiograph of 2019           FINDINGS:      LINES/TUBES:EKG
leads overlie the chest.      LUNGS:The lungs are moderately inflated. Unchanged
consolidation with cavitary   component at the right upper lobe. Patchy bibasi
lar opacities.      PLEURA:No pleural effusion or pneumothorax.      MEDIASTINUM
:The cardiomediastinal silhouette appears unchanged in size and   shape.      TEGAN
OREN/SOFT TISSUES:No acute osseous injury.      ABDOMEN:No free air under the jaiden
phragm.         IMPRESSION:    Unchanged left upper lobe consolidation with cavi
tary component consistent with   pneumonia.      Patchy bibasilar opacities, lik
ely subsegmental atelectasis.      Signed by: Stacy Benton MD on 2019 1:03 P
M        Dictated By: STACY BENTON MD  Electronically Signed By: STACY BENTON MD on 
19 1303  Transcribed By: HI on 19 1303       COPY TO:   LORENE DAVIS MD         Magnesium [id number] 06:59:00* 



             Test Item    Value        Reference Range Interpretation Comments

 

             Magnesium Level (test code = 73560-3) 1.7          1.3-2.1         

           





[facility]&2[facility] [id number] 18:28:00* 



             Test Item    Value        Reference Range Interpretation Comments

 

             HIV (1&2) Antibody (test code = 71099-5) NON-REACTIVE NONREACTIVE  

              





[facility] CenterHIV P24 [id number] 18:28:00* 



             Test Item    Value        Reference Range Interpretation Comments

 

             HIV P24 Antigen (test code = HIV P24 Antigen) NON-REACTIVE NONREACT

LUIS                





[facility] CenterNucleated Red Blood Gjyau3780-21-70 
07:49:00* 



             Test Item    Value        Reference Range Interpretation Comments

 

             Nucleated Red Blood Cells (test code = 32299-1) 1                  

                     





[facility] CenterCreatine Kinase [id number] 07:17:00* 



             Test Item    Value        Reference Range Interpretation Comments

 

             Creatine Kinase MB (test code = 57670-4) 0.30         0-5.0        

              





[facility] CenterTroponin -90-39 07:17:00* 



             Test Item    Value        Reference Range Interpretation Comments

 

             Troponin I (test code = NWE1491) < 0.001      0-0.300              

      





[facility] CenterCreatine Zgffip0429-10-61 07:09:00* 



             Test Item    Value        Reference Range Interpretation Comments

 

             Creatine Kinase (test code = 2157-6) 33                      

          





[facility] SINGLE (PORTABLE)2019 
05:25:00                                                                        
             [place] [address]      Patient Name: ROBERT VORA                                   MR #: [id number]                     
: 1985                                   Age/Sex: 34/M  Acct #: 
[id number]                              Req #: [id number]  Adm Physician: 
JETT GUILLERMO MD                                      Ordered by: ERICA REYNOLDS MD, MD                            Report #: [id number]        Location: 
MED/SURG3                               Room/Bed: [facility]               
_______________________________________
____________________________________________________________    Procedure: 1106-
0005 DX/CHEST SINGLE (PORTABLE)  Exam Date: 19                            
Exam Time: 0450                                              REPORT STATUS: Sign
ed    EXAMINATION:  CHEST SINGLE (PORTABLE)          INDICATION: Pneumonia.     
 COMPARISON:  Chest radiograph and CT Chest 2019.           FINDINGS:   TU
BES and LINES:  None.      LUNGS: Low lung volumes. Persistent cavitary consolid
ation in the left midlung.   Mild patchy right lower lung opacity.      PLEURA: 
No pleural effusion or pneumothorax.       HEART AND MEDIASTINUM:  The cardiome
diastinal silhouette is unchanged.      BONES AND SOFT TISSUES:  No acute osseou
s abnormality.      UPPER ABDOMEN: No free air under the diaphragm.          IMP
RESSION:    Persistent cavitary consolidation in the left lung, consistent with 
pneumonia   as seen on CT chest. Mild patchy right lower lung opacity may repres
ent   developing pneumonia or atelectasis.      Signed by: Dr. Juan Bond MD on
2019 5:27 AM        Dictated By: JUAN BOND MD  Electronically Signed By: 
JUAN BOND MD on 19  Transcribed By: HI on 19       C
OPY TO:   ERICA REYNOLDS         CT CHEST -66-34 17:31:00                  
                                                                   [address] [place], [place], [place] [address]      Patient Name: ROBERT VORA      
                            MR #: [id number]                     : 1985
                                  Age/Sex: 34/M  Acct #: [id number]           
                  Req #: [id number]  Adm Physician: JETT GUILLERMO MD             
                        Ordered by: ERICA REYNOLDS MD, MD                       
    Report #: [id number]        Location: [facility]                                 
Room/Bed: [place] [address]           _______________________________________
____________________________________________________________    Procedure: 1105-
0033 CT/CT CHEST W  Exam Date:                             Exam Time:           
                                   REPORT STATUS: Signed    CT chest with enhan
cement       CPT code: 91174      INDICATION: Fever, cough      TECHNIQUE: 5 mm 
collimation axial images obtained from the thoracic inlet to   the level of the 
diaphragm following uneventful administration of 100 cc of low   osmolar, nonion
ic intravenous contrast.      RADIATION DOSE:        Total DLP: 573.92 mGy*cm   
    Estimated effective dose: (DLP x 0.015 x size factor) mSv        CTDIvol has
been reviewed. It is below the limits set by the Radiation   Protocol Committee 
(RPC).      Dose reduction techniques used: Automated exposure control, adjust
ment of the   mAs and/or kVp according to patient size, standardized low-dose pr
otocol,   and/or iterative reconstruction technique.      Comparison: Chest x-ra
y 1301 hours.      CHEST FINDINGS:      Lymph nodes: No enlarged axillary or sup
raclavicular lymph nodes. There are   multiple prominent mediastinal lymph nodes
, particularly in the AP window and   the subcarinal spaces. Left hilar lymph no
eboni are prominent.      Thyroid: Visualized portions are normal.      Mediastinu
m: No pericardial effusion.  Heart and great vessels enhance normally   without 
filling defects.  The esophagus is normal.      Lungs:       Right Lung: Subtle 
band of subsegmental atelectasis in the posterior upper   lobe.      Left Lung: 
Large area of dense infiltrate in the upper lobe with several areas   of central
cavitation, the largest measuring 1.8 cm. There are patchy areas of   groundgla
ss attenuation in the superior segment of the lower lobe and in the   lingula.  
   Airways: Clear.      Pleura:  Trace posterior left pleural effusion. No pneu
mothorax.      ABDOMEN:       There are coarse calcifications in the spleen. No 
soft tissue mass or   lymphadenopathy in the visualized upper abdomen.      Bone
s: No focal osseous lesions.      IMPRESSION:      1. Large infiltrate in the le
ft upper lobe with cavitations and multiple   infiltrates in the lingula and low
er lobe. Findings are suggestive of cavitary   pneumonia. Recommend close interv
al follow-up to document interval   change/resolution.      2.  Enlarged mediast
inal and left hilar lymph nodes are likely reactive.      Signed by: Dr. Maggie Kwok MD on 2019 5:40 PM        Dictated By: TIKA KWOK MD  El
ectronically Signed By: TIKA KWOK MD on 19  Transcribed By: DAVIE KAUR on 19       COPY TO:   ERICA REYNOLDS         Urine WBC
2019 16:13:00* 



             Test Item    Value        Reference Range Interpretation Comments

 

             Urine WBC (test code = 5821-4) NONE         0-5                    

    





[facility] CenterUrine [id number] 16:13:00* 



             Test Item    Value        Reference Range Interpretation Comments

 

             Urine RBC (test code = 08604-4) 0-5          0-5                   

     





[facility] CenterUrine Niilmzag8760-57-14 16:13:00* 



             Test Item    Value        Reference Range Interpretation Comments

 

             Urine Bacteria (test code = 91224-9) MODERATE     NONE             

          





[facility] CenterUrine Epithelial [id number] 16:13:00
  * 



             Test Item    Value        Reference Range Interpretation Comments

 

             Urine Epithelial Cells (test code = 82430-4) NONE         NONE     

                  





[facility] CenterU[facility] Alexd6800-39-24 16:03:00* 



             Test Item    Value        Reference Range Interpretation Comments

 

             Urine Color (test code = 5778-6) YELLOW       YELLOW               

      





[facility] CenterUrine Fixcrhc6427-37-03 16:03:00* 



             Test Item    Value        Reference Range Interpretation Comments

 

             Urine Clarity (test code = 66780-2) SL CLOUDY    CLEAR             

         





[facility] CenterUrine Specific Whjfonu6752-80-28 16:03:00
  * 



             Test Item    Value        Reference Range Interpretation Comments

 

             Urine Specific Gravity (test code = 5811-5) 1.015        1.010-1.02

5                





[facility] CenterU[facility] [id number] 16:03:00* 



             Test Item    Value        Reference Range Interpretation Comments

 

             Urine pH (test code = 35369-5) 6            5-7                    

    





[facility] CenterU[facility] Leukocyte Armmijba8666-76-10 
16:03:00* 



             Test Item    Value        Reference Range Interpretation Comments

 

             Urine Leukocyte Esterase (test code = 67636-9) NEGATIVE     NEGATIV

E                   





[facility] Center[facility] Cjjmezm2797-35-57 16:03:00* 



             Test Item    Value        Reference Range Interpretation Comments

 

             Urine Nitrite (test code = 86717-3) NEGATIVE     NEGATIVE          

         





[facility] Center[facility] Xwzgqdf5108-80-63 16:03:00* 



             Test Item    Value        Reference Range Interpretation Comments

 

             Urine Protein (test code = 57735-3) 2+           NEGATIVE          

         





[facility] Center[facility] Glucose (UA)2019 16:03:00* 



             Test Item    Value        Reference Range Interpretation Comments

 

             Urine Glucose (UA) (test code = 64243-7) NEGATIVE     NEGATIVE     

              





[facility] CenterU[facility] [id number] 16:03:00* 



             Test Item    Value        Reference Range Interpretation Comments

 

             Urine Ketones (test code = 99021-7) TRACE        NEGATIVE          

         





[facility] CenterU[facility] Jerhqstxshov8223-45-44 16:03:00* 



             Test Item    Value        Reference Range Interpretation Comments

 

             Urine Urobilinogen (test code = 25718-6) 1            0.2-1        

              





[facility] CenterU[facility] Quomskkas6763-44-07 16:03:00* 



             Test Item    Value        Reference Range Interpretation Comments

 

             Urine Bilirubin (test code = 1977-8) NEGATIVE     NEGATIVE         

          





[facility] CenterU[facility] [id number] 16:03:00* 



             Test Item    Value        Reference Range Interpretation Comments

 

             Urine Blood (test code = 41566-9) 1+           NEGATIVE            

       





[facility] CenterCHEST 2 [id number] 13:11:00         
                                                                            [place] [address]      Patient Name: ROBERT VORA      
                            MR #: [id number]                     : 1985
                                  Age/Sex: 34/M  Acct #: [id number]           
                  Req #: [id number]  [facility] Physician:                             
                        Ordered by: ERICA REYNOLDS MD, MD                       
    Report #: [id number]        Location: ER                                     
Room/Bed:                     _______________________________________
____________________________________________________________    Procedure: 1105-
0051 DX/CHEST 2 VIEWS  Exam Date:                             Exam Time:        
                                      REPORT STATUS: Signed    EXAMINATION:  CH
EST 2 VIEWS          INDICATION: Chest pain, shortness of breath      COMPARISON
: None           FINDINGS:      LINES/TUBES:None      LUNGS:The lungs are well-i
nflated. There is a 6.5 x 5.5 cm consolidation at the   lateral left midlung zon
e with internal lucent component.      PLEURA:No pleural effusion or pneumothora
x.      MEDIASTINUM:The cardiomediastinal silhouette appears normal in size and 
shape.      BONES/SOFT TISSUES:No acute osseous injury.      ABDOMEN:No free air
under the diaphragm.         IMPRESSION:    Left midlung zone 6.5 x 5.5 cm cons
olidation with internal lucency for which   the differential includes pneumonia 
or a cavitary mass lesion.      RECOMMENDATION:   Chest CT for further evaluatio
n.      Signed by: Stacy Benton MD on 2019 1:13 PM        Dictated By: FERNANDO BENTON MD  Electronically Signed By: STACY BENTON MD on 19 1313  Transcribed 
By: HI on 19 1317       COPY TO:   ERICA REYNOLDS         Influenza 
Virus Types A,B Skuuhdg1183-60-42 12:54:00* 



             Test Item    Value        Reference Range Interpretation Comments

 

             Influenza Virus Types A,B Antigen (test code = 39302-0) NEGATIVE   

  NEGATIVE                   





[facility] CenterGroup A Streptococcus [id number] 
12:48:00* 



             Test Item    Value        Reference Range Interpretation Comments

 

             Group A Streptococcus Screen (test code = 41725-5) POSITIVE     NEG

[facility]

## 2020-05-19 NOTE — EMERGENCY DEPARTMENT NOTE
History of Present Illnes


History of Present Illness


Chief Complaint:  Abdominal Complaints


History of Present Illness


This is a 34 year old  male arrived with 1 day h/o RLQ pain, pt states 

pain is constant worse with motion, denies any trauma. Admits to nausea but not 

vomiting, diarrhea or constipation. Pt denies any fever. 





Chief Complaint Comment Patient c/o RLQ pain that started today while working. 

Patient states he felt a popping sensation to RLQ and pushed the "ball" back in.

Patient also c/o of muscles locking up that also started today. Dark tea color 

urine noted.


Historian:  Patient


Arrival Mode:  Car


Radiation:  flank


Severity:  moderate


Onset quality:  sudden


Duration (how long):  hour(s)


Timing of current episode:  intermittent


Progression:  unchanged


Chronicity:  new


Context:  recent illness, recent surgery, recent immobilization, recent travel, 

trauma/injury


Relieving factors:  none


Exacerbating factors:  none


Associated symptoms:  denies other symptoms





Past Medical/Family History


Physician Review


I have reviewed the patient's past medical and family history.  Any updates have

been documented here.





Past Medical History


Recent Fever:  No


Clinical Suspicion of Infectio:  No


New/Unexplained Change in Ment:  No


Past Medical History:  None


Past Surgical History:  None





Social History


Smoking Cessation:  Unknown if ever smoked





Family History


Family history of heart diseas:  No





Other


Last Tetanus:  UTD





Review of Systems


Review of Systems


Constitutional:  no symptoms


EENTM:  no symptoms


Cardiovascular:  no symptoms


Respiratory:  no symptoms


Gastrointestinal:  no symptoms, as per HPI, abdominal pain, nausea; 


   constipation, diarrhea, vomiting


Genitourinary:  no symptoms; 


   discharge, dysuria, frequency, hematuria


Musculoskeletal:  no symptoms


Neurological:  no symptoms


Psychological:  no symptoms


Endocrine:  no symptoms


Hematological/Lymphatic:  no symptoms


Review of other systems


All other systems reviewed and negative.





Physical Exam


Related Data


Allergies:  


Coded Allergies:  


     No Known Allergies (Unverified , 11/5/19)


Triage Vital Signs





Vital Signs








  Date Time  Temp Pulse Resp B/P (MAP) Pulse Ox O2 Delivery O2 Flow Rate FiO2


 


5/19/20 20:04 98.5 86 20 142/102 100   








Vital signs reviewed:  Yes





Physical Exam


CONSTITUTIONAL





Constitutional:  well-developed, well-nourished


HENT


HENT:  normocephalic, atraumatic, oropharynx clear/moist, nose normal


HENT L/R:  left ext ear normal, right ext ear normal


EYES





Eyes:  PERRL, conjunctivae normal


NECK


Neck:  ROM normal


PULMONARY


Pulmonary:  effort normal, breath sounds normal


CARDIOVASCULAR





Cardiovascular:  regular rhythm, heart sounds normal, capillary refill normal, 

normal rate


GASTROINTESTINAL





Abdominal:  soft, bowel sounds normal; distension; tender (RLQ tenderness on 

deep palpation); guarding, mass, rebound


GENITOURINARY





Genitourinary:  exam deferred


SKIN


Skin:  warm, dry


MUSCULOSKELETAL





Musculoskeletal:  ROM normal


NEUROLOGICAL





Neurological:  alert, oriented x 3, no gross motor or sensory deficits


PSYCHOLOGICAL


Psychological:  mood/affect normal, judgement normal





Results


Laboratory


Result Diagram:  


5/19/20 2015 5/19/20 2015





Laboratory





Laboratory Tests








Test


 5/19/20


20:15


 


White Blood Count


 10.25 x10e3/uL


(4.8-10.8)


 


Red Blood Count


 5.63 x10e6/uL


(4.3-5.7)


 


Hemoglobin


 16.3 g/dL


(14.0-18.0)


 


Hematocrit


 48.4 %


(38.2-49.6)


 


Mean Corpuscular Volume


 86.0 fL


(81-99)


 


Mean Corpuscular Hemoglobin


 29.0 pg


(28-32)


 


Mean Corpuscular Hemoglobin


Concent 33.7 g/dL


(31-35)


 


Red Cell Distribution Width


 13.9 %


(11.7-14.4)


 


Platelet Count


 271 x10e3/uL


(140-360)


 


Neutrophils (%) (Auto)


 79.4 %


(38.7-80.0)


 


Lymphocytes (%) (Auto)


 9.2 %


(18.0-39.1)


 


Monocytes (%) (Auto)


 7.8  %


(4.4-11.3)


 


Eosinophils (%) (Auto)


 2.6 %


(0.0-6.0)


 


Basophils (%) (Auto)


 0.6 %


(0.0-1.0)


 


Neutrophils # (Auto) 8.1 (2.1-6.9) 


 


Lymphocytes # (Auto) 0.9 (1.0-3.2) 


 


Monocytes # (Auto) 0.8 (0.2-0.8) 


 


Eosinophils # (Auto) 0.3 (0.0-0.4) 


 


Basophils # (Auto) 0.1 (0.0-0.1) 


 


Absolute Immature Granulocyte


(auto 0.04 x10e3/uL


(0-0.1)


 


Urine Color


 Orange


(YELLOW)


 


Urine Clarity


 Sl cloudy


(CLEAR)


 


Urine pH 5.5 (5 - 7) 


 


Urine Specific Gravity


 1.030


(1.010-1.025)


 


Urine Protein


 >=300


(NEGATIVE)


 


Urine Glucose (UA)


 Negative


(NEGATIVE)


 


Urine Ketones 1+ (NEGATIVE) 


 


Urine Blood


 Negative


(NEGATIVE)


 


Urine Nitrite


 Positive


(NEGATIVE)


 


Urine Bilirubin


 Moderate


(NEGATIVE)


 


Urine Urobilinogen


 0.2 mg/dL (0.2


- 1)


 


Urine Leukocyte Esterase


 Negative


(NEGATIVE)


 


Urine RBC


 None /HPF


(0-5)


 


Urine WBC


 None /HPF


(0-5)


 


Urine Epithelial Cells


 Few /LPF


(NONE)


 


Urine Transitional Epithelial


Cells Rare (NONE) 





 


Urine Renal Epithelial Cells Rare (NONE) 


 


Urine Bacteria


 Few /HPF


(NONE)


 


Urine Hyaline Casts 0-1 (0-1) 


 


Sodium Level


 135 mmol/L


(136-145)


 


Potassium Level


 4.7 mmol/L


(3.5-5.1)


 


Chloride Level


 92 mmol/L


()


 


Carbon Dioxide Level


 24 mmol/L


(22-29)


 


Anion Gap


 23.7 mmol/L


(8-16)


 


Blood Urea Nitrogen


 14 mg/dL


(7-26)


 


Creatinine


 3.66 mg/dL


(0.72-1.25)


 


Estimat Glomerular Filtration


Rate 19 ML/MIN


(60-)


 


BUN/Creatinine Ratio 4 (6-25) 


 


Glucose Level


 144 mg/dL


()


 


Calcium Level


 11.0 mg/dL


(8.4-10.2)


 


Total Bilirubin


 1.1 mg/dL


(0.2-1.2)


 


Aspartate Amino Transf


(AST/SGOT) 28 IU/L (5-34) 





 


Alanine Aminotransferase


(ALT/SGPT) 20 IU/L (0-55) 





 


Alkaline Phosphatase


 68 IU/L


()


 


Total Protein


 10.8 g/dL


(6.5-8.1)


 


Albumin


 5.4 g/dL


(3.5-5.0)


 


Globulin


 5.4 g/dL


(2.3-3.5)


 


Albumin/Globulin Ratio 1.0 (0.8-2.0) 


 


Lipase 15 U/L (8-78) 


 


Urine Opiates Screen


 Negative


(NEGATIVE)


 


Urine Methadone Screen


 Negative


(NEGATIVE)


 


Urine Barbiturates Screen


 Negative


(NEGATIVE)


 


Urine Phencyclidine Screen


 Negative


(NEGATIVE)


 


Urine Amphetamines Screen


 Negative


(NEGATIVE)


 


Urine Methamphetamines Screen


 Negative


(NEGATIVE)


 


Urine Benzodiazepines Screen


 Negative


(NEGATIVE)


 


Urine Cocaine Screen


 Negative


(NEGATIVE)


 


Urine Cannabinoids Screen


 Negative


(NEGATIVE)








Lab results reviewed:  Yes





Imaging


Imaging results reviewed:  Yes


Impressions


IMPRESSION: 


Circumferential mild wall thickening of the bladder may reflect partial


decompression or cystitis in the appropriate clinical setting. Mild


prostatomegaly.





Apparent mild wall thickening of some jejunal loops may reflect partial


decompression versus nonspecific enteritis.





Sigmoid colonic diverticulosis without evidence of diverticulitis.





A 5 mm ground glass nodule in the left lower lobe, likely infectious or


inflammatory.





Signed by: Dr. Yoli Marsh MD on 5/19/2020 11:12 PM





Procedures


12 Lead ECG Interpretation


:  Interpreted by ED physician


Prior ECD tracings:  reviewed


Rhythm:  sinus rhythm


Rate:  normal


QRS axis:  normal


ST segments normal:  Yes


T waves normal:  Yes


Clinical Impression:  normal ECG





Critical Care Time


Subsequent provider


I assumed direction of critical care for this patient from another provider of m

y specialty.





Assessment & Plan


Assessment & Plan


Final Impression:  


(1) Renal failure


(2) Cystitis


Assessment & Plan


34 M arrived to the ED with RLQ Pain


-cbc, cmp, cardiac makers


-UA


-CT AP


Depart Disposition:  ADMITTED


Last Vital Signs











  Date Time  Temp Pulse Resp B/P (MAP) Pulse Ox O2 Delivery O2 Flow Rate FiO2


 


5/19/20 20:44  68 17 128/91 100   


 


5/19/20 20:04 98.5       








Home Meds


Reported Medications


Amoxicillin/Potassium Clav (AUGMENTIN 875-125 TABLET) 1 Each Tablet, 875 MG PO 

Q12H, #28 TAB


   11/15/19


Medications in the ED





Sodium Chloride 1,000 ml @  0 mls/hr Q0M STAT IV  Last administered on 5/19/20at

20:32; Admin Dose 999 MLS/HR;  Start 5/19/20 at 20:13;  Stop 5/19/20 at 20:14


Ketorolac Tromethamine 30 mg ONCE  ONCE IV  Last administered on 5/19/20at 

20:32; Admin Dose 30 MG;  Start 5/19/20 at 20:30;  Stop 5/19/20 at 20:31











SHELBI IQBAL DO            May 19, 2020 21:02

## 2020-05-19 NOTE — DIAGNOSTIC IMAGING REPORT
EXAM: CT Abdomen and Pelvis WITHOUT contrast  



INDICATION: Right lower quadrant abdominal pain.



COMPARISON: None.



TECHNIQUE: Abdomen and pelvis were scanned utilizing a multidetector helical

scanner from the lung base to the pubic symphysis without administration of IV

contrast. Absence of intravenous contrast decreases sensitivity for detection

of focal lesions and vascular pathology. Coronal and sagittal reformations were

obtained. Routine protocol was performed. 

            IV CONTRAST: None.

            ORAL CONTRAST: Gastrografin

            RADIATION DOSE: Total DLP: 287.9 mGy*cm

             Estimated effective dose: (DLP x 0.015 x size factor) mSv

            COMPLICATIONS: None



FINDINGS:



LINES and TUBES: None.



LOWER THORAX:  There is a 5 mm ground glass nodule in the left lower lobe on

series 2, image 4.



HEPATOBILIARY:      No focal hepatic lesions. No biliary ductal dilation. 



GALLBLADDER: No radio-opaque stones or sludge.  No wall thickening.



SPLEEN: Atrophic. Calcified splenic granulomas.



PANCREAS: No focal masses or ductal dilatation.  



ADRENALS: No adrenal nodules    



KIDNEYS/URETERS:  No hydronephrosis. No cystic or solid mass lesions.  No

stones.



GI TRACT: No evidence of bowel obstruction. Partial decompression of jejunal

loops which appear mildly thick-walled..       Appendix is normal. Sigmoid

colonic diverticulosis without evidence of diverticulitis.



PELVIC ORGANS/BLADDER: Appears circumferentially mildly thick-walled and

partially decompressed. Mildly enlarged prostate, measuring up to 4.4 cm.



LYMPH NODES: No lymphadenopathy.



VESSELS: Unremarkable.



PERITONEUM / RETROPERITONEUM: No free air or fluid.



BONES: Unremarkable.



SOFT TISSUES: Tiny fat-containing umbilical hernia.



IMPRESSION: 

Circumferential mild wall thickening of the bladder may reflect partial

decompression or cystitis in the appropriate clinical setting. Mild

prostatomegaly.



Apparent mild wall thickening of some jejunal loops may reflect partial

decompression versus nonspecific enteritis.



Sigmoid colonic diverticulosis without evidence of diverticulitis.



A 5 mm ground glass nodule in the left lower lobe, likely infectious or

inflammatory.



Signed by: Dr. Yoli Marsh MD on 5/19/2020 11:12 PM

## 2020-05-20 VITALS — DIASTOLIC BLOOD PRESSURE: 76 MMHG | SYSTOLIC BLOOD PRESSURE: 143 MMHG

## 2020-05-20 VITALS — DIASTOLIC BLOOD PRESSURE: 60 MMHG | SYSTOLIC BLOOD PRESSURE: 135 MMHG

## 2020-05-20 VITALS — SYSTOLIC BLOOD PRESSURE: 124 MMHG | DIASTOLIC BLOOD PRESSURE: 67 MMHG

## 2020-05-20 VITALS — SYSTOLIC BLOOD PRESSURE: 125 MMHG | DIASTOLIC BLOOD PRESSURE: 80 MMHG

## 2020-05-20 VITALS — DIASTOLIC BLOOD PRESSURE: 67 MMHG | SYSTOLIC BLOOD PRESSURE: 122 MMHG

## 2020-05-20 VITALS — DIASTOLIC BLOOD PRESSURE: 100 MMHG | SYSTOLIC BLOOD PRESSURE: 141 MMHG

## 2020-05-20 VITALS — SYSTOLIC BLOOD PRESSURE: 135 MMHG | DIASTOLIC BLOOD PRESSURE: 60 MMHG

## 2020-05-20 RX ADMIN — SODIUM CHLORIDE SCH MLS/HR: 9 INJECTION, SOLUTION INTRAVENOUS at 21:40

## 2020-05-20 RX ADMIN — SODIUM CHLORIDE SCH MLS/HR: 9 INJECTION, SOLUTION INTRAVENOUS at 14:45

## 2020-05-20 NOTE — XMS REPORT
Patient Summary Document

                             Created on: 2020



VIELKAROBERT

External Reference #: [id number]

: 1985

Sex: Male



Demographics





                          Address                   [address] [place]  [address]

 

                          Home Phone                [phone]

 

                          Preferred Language        Unknown

 

                          Marital Status            Unknown

 

                          Confucianism Affiliation     Unknown

 

                          Race                      Unknown

 

                                        Additional Race(s)  

 

                          Ethnic Group              Unknown





Author





                          Author                    [facility]

 

                          Organization              [facility]

 

                          Address                   [address]

[place]  [address]



 

                          Phone                     Unavailable







Support





                Name            Relationship    Address         Phone

 

                    ERICA REYNOLDS    Caregiver           [address]

[place]  [address] [phone]

 

                NO,  FAMILY     Caregiver       Unknown         Unavailable

 

                NO,  PCP        Caregiver       Unknown         Unavailable

 

                    EAGLE LUZ,  JETT   Caregiver           [address]

[place], [place]  [address] [phone]

 

                    JETT GUILLERMO MD   Caregiver           [address]

[place]  [address]                     [phone]

 

                    INÉS AZUL  Next Of Kin         [address] [place]  [address] [phone]







Care Team Providers





                    Care Team Member Name Role                Phone

 

                    NO,  PCP            PCP                 Unavailable

 

                    CHAPITO IQBAL   Attphys             Unavailable

 

                    JETT GUILLERMO      Attphys             Unavailable

 

                    JETT GUILLERMO      Admphys             Unavailable







Problems





           Condition Name Condition Details Condition Category Status     Onset 

Date Resolution

Date            Last Treatment Date Treating Clinician Comments        Source

 

       Fever  Fever  Problem Active                                    [facility]

 

       Hypokalemia Hypokalemia Problem Active                                   

 [facility]

 

       Pleurisy Pleurisy Problem Active                                    [facility]

 

       Pneumonia Pneumonia Problem Active                                    [facility]







Allergies, Adverse Reactions, Alerts

This patient has no known allergies or adverse reactions.



Medications





             Ordered Medication Name Filled Medication Name Start Date   Stop Da

te    Current 

Medication? Ordering Clinician Indication Dosage     Frequency  Signature (SIG) 

Comments                  Components                Source

 

                          Amoxicillin/Potassium Clav (Augmentin 875-125 Tablet) 

1 Each Tablet 

Amoxicillin/Potassium Clav (Augmentin 875-125 Tablet) 1 Each Tablet             

          Yes                              

875                       Every 12 Hours                           [facility]







Procedures





                Procedure       Date / Time Performed Performing Clinician Sour

e

 

                X-ray of chest, two views 2019 00:00:00 SILVANA LEAL CH

[facility]

 

                X-ray of chest, two views 2019 00:00:00 SILVANA LEAL    CH

[facility]

 

                Bronchoscopy with biopsy 2019 00:00:00 LORENE DAVIS CH

[facility]

 

                X-ray of chest, two views 2019 00:00:00 ERICA REYNOLDS 

[facility]

 

                Computed tomography of chest with contrast 2019 00:00:00 ERICA LUNA 

[facility]







Encounters





             Start Date/Time End Date/Time Encounter Type Admission Type Attendi

[facility]   Care Department Encounter ID    Source

 

           2019 14:01:00 2019-11-15 10:27:00 Discharged Inpatient 1       

   JETT GUILLERMO 

[facility]              [id number]        [facility]







Results





           Test Description Test Time  Test Comments Results    Result Comments 

Source

 

                CT ABDOMEN/PELVIS WO 2020 22:59:00                        

                              

                                                [facility]                        [address] [place] [address]      Patient Name: ROBERT VORA                              
 MR #: [id number]                  : 1985                              
    Age/Sex: 34/M  Acct #: [id number]                              Req #: [id number]
[id number]  Adm Physician:                                                      
Ordered by: SHELBI IQBAL DO                         Report #: [id number]    
   Location: ER                                      Room/Bed:                  
 
________________________________________________________________________________

___________________    Procedure: [id number] CT/CT ABDOMEN/PELVIS WO  Exam Date: 
20                            Exam Time: 0                             
                REPORT STATUS: Signed    EXAM: CT Abdomen and Pelvis WITHOUT 
contrast        INDICATION: Right lower quadrant abdominal pain.      
COMPARISON: None.      TECHNIQUE: Abdomen and pelvis were scanned utilizing a 
multidetector helical   scanner from the lung base to the pubic symphysis wit
hout administration of IV   contrast. Absence of intravenous contrast decreases 
sensitivity for detection   of focal lesions and vascular pathology. Coronal and
sagittal reformations were   obtained. Routine protocol was performed.          
     IV CONTRAST: None.               ORAL CONTRAST: Gastrografin               
RADIATION DOSE: Total DLP: 287.9 mGy*cm                Estimated effective dose:
(DLP x 0.015 x size factor) mSv               COMPLICATIONS: None      FINDINGS:
     LINES and TUBES: None.      LOWER THORAX:  There is a 5 mm ground glass 
nodule in the left lower lobe on   series 2, image 4.      HEPATOBILIARY:      
No focal hepatic lesions. No biliary ductal dilation.       GALLBLADDER: No 
radio-opaque stones or sludge.  No wall thickening.      SPLEEN: Atrophic. 
Calcified splenic granulomas.      PANCREAS: No focal masses or ductal 
dilatation.        ADRENALS: No adrenal nodules          KIDNEYS/URETERS:  No 
hydronephrosis. No cystic or solid mass lesions.  No   stones.      GI TRACT: No
evidence of bowel obstruction. Partial decompression of jejunal   loops which 
appear mildly thick-walled..       Appendix is normal. Sigmoid   colonic 
diverticulosis without evidence of diverticulitis.      PELVIC ORGANS/BLADDER: 
Appears circumferentially mildly thick-walled and   partially decompressed. 
Mildly enlarged prostate, measuring up to 4.4 cm.      LYMPH NODES: No 
lymphadenopathy.      VESSELS: Unremarkable.      PERITONEUM / RETROPERITONEUM: 
No free air or fluid.      BONES: Unremarkable.      SOFT TISSUES: Tiny fat-
containing umbilical hernia.      IMPRESSION:    Circumferential mild wall 
thickening of the bladder may reflect partial   decompression or cystitis in the
appropriate clinical setting. Mild   prostatomegaly.      Apparent mild wall 
thickening of some jejunal loops may reflect partial   decompression versus 
nonspecific enteritis.      Sigmoid colonic diverticulosis without evidence of 
diverticulitis.      A 5 mm ground glass nodule in the left lower lobe, likely 
infectious or   inflammatory.      Signed by: Dr. Juan Bond MD on 2020 
11:12 PM        Dictated By: JUAN BOND MD  Electronically Signed By: JUAN BOND MD on 20  Transcribed By: HI on 20       COPY TO:   
SHELBI IQBAL DO                                     

 

                    White Blood Count   2019-11-15 05:57:00   

 

                                        Test Item

 

             White Blood Count (test code = 6690-2) 12.15        4.8-10.8       

            





[facility] 05:57:00* 



             Test Item    Value        Reference Range Interpretation Comments

 

             Red Blood Count (test code = 789-8) 4.17         4.3-5.7           

         





[facility] ChildrenHemoglobin2019-11-15 05:57:00* 



             Test Item    Value        Reference Range Interpretation Comments

 

             Hemoglobin (test code = 12683-3) 12.5         14.0-18.0            

      





[facility] ChildrenHematocrit2019-11-15 05:57:00* 



             Test Item    Value        Reference Range Interpretation Comments

 

             Hematocrit (test code = 4544-3) 36.9         38.2-49.6             

     





[facility] ChildrenMean Corpuscular Volume2019-11-15 
05:57:00* 



             Test Item    Value        Reference Range Interpretation Comments

 

             Mean Corpuscular Volume (test code = 787-2) 88.5         81-99     

                 





[facility] ChildrenMean Corpuscular Hemoglobin2019-11-15 
05:57:00* 



             Test Item    Value        Reference Range Interpretation Comments

 

             Mean Corpuscular Hemoglobin (test code = 785-6) 30.0         28-32 

                     





[facility] ChildrenMean Corpuscular Hemoglobin Concent
2019-11-15 05:57:00* 



             Test Item    Value        Reference Range Interpretation Comments

 

             Mean Corpuscular Hemoglobin Concent (test code = 786-4) 33.9       

  31-35                      





[facility] ChildrenRed Cell Distribution Width2019-11-15 
05:57:00* 



             Test Item    Value        Reference Range Interpretation Comments

 

             Red Cell Distribution Width (test code = 19771-9) 14.4         11.7

-14.4                  





[facility] ChildrenPlatelet Count2019-11-15 05:57:00* 



             Test Item    Value        Reference Range Interpretation Comments

 

             Platelet Count (test code = 777-3) 552          140-360            

        





[facility] ChildrenNeutrophils (%) (Auto)2019-11-15 05:57:00
  * 



             Test Item    Value        Reference Range Interpretation Comments

 

             Neutrophils (%) (Auto) (test code = 35883-2) 77.4         38.7-80.0

                  





[facility] ChildrenLymphocytes (%) (Auto)2019-11-15 05:57:00
  * 



             Test Item    Value        Reference Range Interpretation Comments

 

             Lymphocytes (%) (Auto) (test code = 736-9) 12.2         18.0-39.1  

                





[facility] ChildrenMonocytes (%) (Auto)2019-11-15 05:57:00* 



             Test Item    Value        Reference Range Interpretation Comments

 

             Monocytes (%) (Auto) (test code = 5905-5) 7.5          4.4-11.3    

               





[facility] ChildrenEosinophils (%) (Auto)2019-11-15 05:57:00
  * 



             Test Item    Value        Reference Range Interpretation Comments

 

             Eosinophils (%) (Auto) (test code = 713-8) 0.6          0.0-6.0    

                





[facility] ChildrenBasophils (%) (Auto)2019-11-15 05:57:00* 



             Test Item    Value        Reference Range Interpretation Comments

 

             Basophils (%) (Auto) (test code = 706-2) 0.7          0.0-1.0      

              





[facility] ChildrenIM GRANULOCYTES %2019-11-15 05:57:00* 



             Test Item    Value        Reference Range Interpretation Comments

 

             IM GRANULOCYTES % (test code = IM GRANULOCYTES %) 1.6          0.0-

1.0                    





[facility] ChildrenNeutrophils # (Auto)2019-11-15 05:57:00* 



             Test Item    Value        Reference Range Interpretation Comments

 

             Neutrophils # (Auto) (test code = 751-8) 9.4          2.1-6.9      

              





[facility] ChildrenLymphocytes # (Auto)2019-11-15 05:57:00* 



             Test Item    Value        Reference Range Interpretation Comments

 

             Lymphocytes # (Auto) (test code = 49049-6) 1.5          1.0-3.2    

                





[facility] ChildrenMonocytes # (Auto)2019-11-15 05:57:00* 



             Test Item    Value        Reference Range Interpretation Comments

 

             Monocytes # (Auto) (test code = 742-7) 0.9          0.2-0.8        

            





[facility] ChildrenEosinophils # (Auto)2019-11-15 05:57:00* 



             Test Item    Value        Reference Range Interpretation Comments

 

             Eosinophils # (Auto) (test code = 711-2) 0.1          0.0-0.4      

              





[facility] ChildrenBasophils # (Auto)2019-11-15 05:57:00* 



             Test Item    Value        Reference Range Interpretation Comments

 

             Basophils # (Auto) (test code = 704-7) 0.1          0.0-0.1        

            





[facility] ChildrenAbsolute Immature Granulocyte (auto
2019-11-15 05:57:00* 



             Test Item    Value        Reference Range Interpretation Comments

 

                                        Absolute Immature Granulocyte (auto (damien

t code = Absolute Immature Granulocyte 

(auto)          0.19            0-0.1                            





[facility] ChildrenCHEST 2 [id number] 10:15:00         
                                                                            [facility]                        [address] [place] [address]      Patient Name: ROBERT VORA      
                            MR #: [id number]                     : 1985
                                  Age/Sex: 34/M  Acct #: [id number]           
                  Req #: [id number]  Adm Physician: JETT GUILLERMO MD             
                        Ordered by: SILVANA LEAL MD                            
Report #: [id number]        Location: [facility]                               
Room/Bed: [facility]               _____________________________________________
______________________________________________________    Procedure: [id number] D
X/CHEST 2 VIEWS  Exam Date: 19                            Exam Time: 0950 
                                            REPORT STATUS: Signed    EXAMINATIO
N:  CHEST 2 VIEWS          INDICATION: Pneumonia      COMPARISON: Multiple prior
chest radiograph, most recently of 2019           FINDINGS:      LINES/TU
BES:None      LUNGS:The lungs are moderately inflated. Again seen and essentiall
y unchanged   is the large cavitary lesion at the left lateral midlung. Patchy a
nd nodular   opacities in the right midlung also appear unchanged.      PLEURA:N
o pleural effusion or pneumothorax.      MEDIASTINUM:The cardiomediastinal silho
uette appears unchanged in size and   shape.      BONES/SOFT TISSUES:No acute os
seous injury.      ABDOMEN:No free air under the diaphragm.         IMPRESSION: 
  No significant interval change in left midlung cavitary lesion and smaller   
nodular and patchy opacities at the right midlung.       Signed by: Stacy Benton MD on 2019 10:19 AM        Dictated By: STACY BENTON MD  Electronically Sign
ed By: STACY BENTON MD on 19 1019  Transcribed By: HI on 19 1019 
     COPY TO:   SILVANA LEAL MD         Blood Tdaigxa9665-38-73 09:09:00* 



             Test Item    Value        Reference Range Interpretation Comments

 

                          Blood Culture (test code = 91253368)                  

           NO GROWTH AFTER

72 HOURS                                                     





[facility] ChildrenTB Test (T-Spot)2019 16:15:00* 



             Test Item    Value        Reference Range Interpretation Comments

 

             TB Test (T-Spot) (test code = TB Test (T-Spot)) 0                  

                     





T-SPOT.TB   Negative   Normal Value: NegativePanel A Spot Count (Corrected for N
egative Control) 0Panel B Spot Count (Corrected for Negative Control) 0Negavtive
Control  PassedPositive Conrol    [facility]ed[facility]
Differential Total Cells Kzkkheg9479-65-46 07:53:00* 



             Test Item    Value        Reference Range Interpretation Comments

 

                          Differential Total Cells Counted (test code = Differen

tial Total Cells Counted) 

100                                                          





[facility] ChildrenNeutrophils % (Manual)2019 07:53:00
  * 



             Test Item    Value        Reference Range Interpretation Comments

 

             Neutrophils % (Manual) (test code = 52657-6) 77           40-74    

                  





[facility] ChildrenBand Neutrophils %2019 07:53:00* 



             Test Item    Value        Reference Range Interpretation Comments

 

             Band Neutrophils % (test code = 764-1) 2                           

            





[facility] ChildrenLymphocytes % (Manual)2019 07:53:00
  * 



             Test Item    Value        Reference Range Interpretation Comments

 

             Lymphocytes % (Manual) (test code = 737-7) 12           19-48      

                





[facility] ChildrenMonocytes % (Manual)2019 07:53:00* 



             Test Item    Value        Reference Range Interpretation Comments

 

             Monocytes % (Manual) (test code = 744-3) 8            3.4-9.0      

              





[facility] ChildrenEosinophils % (Manual)2019 07:53:00
  * 



             Test Item    Value        Reference Range Interpretation Comments

 

             Eosinophils % (Manual) (test code = 714-6) 1            0-7        

                





[facility] ChildrenPlatelet Uhyxbsbl3178-03-36 07:53:00* 



             Test Item    Value        Reference Range Interpretation Comments

 

             Platelet Estimate (test code = 08173-8) SLIGHTLY INCREASED         

                   





[facility] for ChildrenPlatelet Morphology Bdqmfeq9607-99-34 
07:53:00* 



             Test Item    Value        Reference Range Interpretation Comments

 

             Platelet Morphology Comment (test code = 44758-7) FEW LARGE        

                       





[facility] ChildrenPoikilocytosis2019-11-13 07:53:00* 



             Test Item    Value        Reference Range Interpretation Comments

 

             Poikilocytosis (test code = 779-9) SLIGHT                          

        





[facility] ChildrenAnisocytosis2019-11-13 07:53:00* 



             Test Item    Value        Reference Range Interpretation Comments

 

             Anisocytosis (test code = 702-1) SLIGHT                            

      





[facility] ChildrenMicrocytosis2019-11-13 07:53:00* 



             Test Item    Value        Reference Range Interpretation Comments

 

             Microcytosis (test code = 741-9) SLIGHT                            

      





[facility] ChildrenMacrocytosis2019-11-13 07:53:00* 



             Test Item    Value        Reference Range Interpretation Comments

 

             Macrocytosis (test code = 738-5) SLIGHT                            

      





[facility] ChildrenOvalocytes2019-11-13 07:53:00* 



             Test Item    Value        Reference Range Interpretation Comments

 

             Ovalocytes (test code = 774-0) FEW                                 

    





[facility] ChildrenRed Cell Morphology Wifeuju6637-39-46 
07:53:00* 



             Test Item    Value        Reference Range Interpretation Comments

 

             Red Cell Morphology Comment (test code = 6742-1) ABNORMAL          

                      





[facility] Clinicsodium Dwojp1780-53-08 06:20:00* 



             Test Item    Value        Reference Range Interpretation Comments

 

             Sodium Level (test code = 2951-2) 131          136-145             

       





[facility] ChildrenPotassium Jymqo0513-45-64 06:20:00* 



             Test Item    Value        Reference Range Interpretation Comments

 

             Potassium Level (test code = 2823-3) 4.0          3.5-5.1          

          





[facility] ChildrenChloride Ldvig6928-71-42 06:20:00* 



             Test Item    Value        Reference Range Interpretation Comments

 

             Chloride Level (test code = 2075-0) 100                      

         





[facility] for ChildrenCarbon Dioxide Wsrpm5339-69-24 06:20:00* 



             Test Item    Value        Reference Range Interpretation Comments

 

             Carbon Dioxide Level (test code = 2028-9) 23           22-29       

               





[facility] for ChildrenAnion Ljp9798-25-34 06:20:00* 



             Test Item    Value        Reference Range Interpretation Comments

 

             Anion Gap (test code = 33037-3) 12.0         8-16                  

     





[facility] ChildrenBlood Urea Knlbtpaf6895-90-01 06:20:00* 



             Test Item    Value        Reference Range Interpretation Comments

 

             Blood Urea Nitrogen (test code = 3094-0) 8            7-26         

              





[facility] ChildrenCreatinine2019-11-13 06:20:00* 



             Test Item    Value        Reference Range Interpretation Comments

 

             Creatinine (test code = 2160-0) 0.78         0.72-1.25             

     





[facility] for ChildrenBUN/Creatinine Igiad1964-51-63 06:20:00* 



             Test Item    Value        Reference Range Interpretation Comments

 

             BUN/Creatinine Ratio (test code = 3097-3) 10           6-25        

               





[facility] for ChildrenEstimat Glomerular Filtration Rate
2019 06:20:00* 



             Test Item    Value        Reference Range Interpretation Comments

 

             Estimat Glomerular Filtration Rate (test code = 597084012) > 60    

     >60                        





Ranges were taken from the National Kidney Disease Education Program and the [facility] literature.Reference ranges:60 or greater: Thvsyo11-42 (
for 3 consecutive months): Chronic kidney disease 15 or less: Kidney failureTexas Scottish Rite Hospital for ChildrenGlucose Edpvo3539-15-58 06:20:00* 



             Test Item    Value        Reference Range Interpretation Comments

 

             Glucose Level (test code = JXG8343) 100                      

         





[facility] ChildrenCalcium Qmehg9798-09-97 06:20:00* 



             Test Item    Value        Reference Range Interpretation Comments

 

             Calcium Level (test code = 98939-1) 9.5          8.4-10.2          

         





[facility] for ChildrenTotal Lbtsfpdcc1091-13-12 06:20:00* 



             Test Item    Value        Reference Range Interpretation Comments

 

             Total Bilirubin (test code = 1975-2) 0.5          0.2-1.2          

          





[facility] Hospital for ChildrenAspartate Amino Transf (AST/SGOT)
2019 06:20:00* 



             Test Item    Value        Reference Range Interpretation Comments

 

                                        Aspartate Amino Transf (AST/SGOT) (test 

code = Aspartate Amino Transf 

(AST/SGOT))     27              5-34                             





[facility])
2019 06:20:00* 



             Test Item    Value        Reference Range Interpretation Comments

 

             Alanine Aminotransferase (ALT/SGPT) (test code = 1742-6) 56        

   0-55                       





[facility] ChildrenTotal Paemewn2735-43-38 06:20:00* 



             Test Item    Value        Reference Range Interpretation Comments

 

             Total Protein (test code = 2885-2) 8.1          6.5-8.1            

        





[facility] ChildrenAlbumin2019-11-13 06:20:00* 



             Test Item    Value        Reference Range Interpretation Comments

 

             Albumin (test code = 1751-7) 2.4          3.5-5.0                  

  





[facility] ChildrenGlobulin2019-11-13 06:20:00* 



             Test Item    Value        Reference Range Interpretation Comments

 

             Globulin (test code = 05396-7) 5.7          2.3-3.5                

    





[facility] ChildrenAlbumin/Globulin Jopmz6878-58-16 06:20:00
  * 



             Test Item    Value        Reference Range Interpretation Comments

 

             Albumin/Globulin Ratio (test code = 1759-0) 0.4          0.8-2.0   

                 





[facility] ChildrenAlkaline Qsgeddmwhnw8871-66-11 06:20:00* 



             Test Item    Value        Reference Range Interpretation Comments

 

             Alkaline Phosphatase (test code = 6768-6) 195                

               





[facility] ChildrenCHEST 2 DQZQX8017-98-65 12:22:00         
                                                                            [facility]                        [address] [place] [address]      Patient Name: ROBERT VORA      
                            MR #: [id number]                     : 1985
                                  Age/Sex: 34/M  Acct #: [id number]           
                  Req #: [id number]  Adm Physician: JETT GUILLERMO MD             
                        Ordered by: SILVANA LEAL MD                            
Report #: [id number]        Location: MED/SURG3                               
Room/Bed: [facility]               _____________________________________________
______________________________________________________    Procedure: [id number] D
X/CHEST 2 VIEWS  Exam Date:                             Exam Time:              
                                REPORT STATUS: Signed    Chest, 2 views,  .             History: TB.      Comparison: 2019.      Findings: The c
ardiac silhouette is within normal limits. Large left upper lobe   cavitary lesi
on and patchy right upper lobe and left lower lobe opacities are   present. Ther
e are no acute osseous or soft tissue abnormalities.       Impression:    Bilate
ral pneumonia, cavitary on the left, consistent with TB.      Signed by: Hi Ruiz on 2019 12:26 PM        Dictated By: HI RUIZ MD  Electronical
ly Signed By: HI RUIZ MD on 19 1226  Transcribed By: HI on  1226       COPY TO:   SILVANA LEAL MD         Reactive Lplcettpxck5570-85-25
07:32:00* 



             Test Item    Value        Reference Range Interpretation Comments

 

             Reactive Lymphocytes (test code = 14239-5) 4                       

                





[facility] 



             Test Item    Value        Reference Range Interpretation Comments

 

             Stomatocytes (test code = 89425-7) SLIGHT                          

        





[facility]2019 
13:00:00                                                                        
             [place] [address]      Patient Name: ROBERT VORA                                   MR #: [id number]                     
: 1985                                   Age/Sex: 34/M  Acct #: 
[id number]                              Req #: [id number]  Adm Physician: 
JETT GUILLERMO MD                                      Ordered by: LORENE DAVIS MD                            Report #: [id number]        Location: 
MED/SURG3                               Room/Bed: 299-1               
_________________________________________
__________________________________________________________    Procedure: 1108-00
31 DX/CHEST SINGLE (PORTABLE)  Exam Date: 19                            Ex
am Time: 1200                                              REPORT STATUS: Signed
   EXAMINATION:  CHEST SINGLE (PORTABLE)          INDICATION: Pneumonia      CO
MPARISON: Chest radiograph of 2019           FINDINGS:      LINES/TUBES:EKG
leads overlie the chest.      LUNGS:The lungs are moderately inflated. Unchanged
consolidation with cavitary   component at the right upper lobe. Patchy bibasi
lar opacities.      PLEURA:No pleural effusion or pneumothorax.      MEDIASTINUM
:The cardiomediastinal silhouette appears unchanged in size and   shape.      TEGAN
OREN/SOFT TISSUES:No acute osseous injury.      ABDOMEN:No free air under the jaiden
phragm.         IMPRESSION:    Unchanged left upper lobe consolidation with cavi
tary component consistent with   pneumonia.      Patchy bibasilar opacities, lik
ely subsegmental atelectasis.      Signed by: Stacy Benton MD on 2019 1:03 P
M        Dictated By: STACY BENTON MD  Electronically Signed By: STACY BENTON MD on 
19 1303  Transcribed By: HI on 19 1303       COPY TO:   LORENE DAVIS MD         Magnesium Wqkhe6349-46-81 06:59:00* 



             Test Item    Value        Reference Range Interpretation Comments

 

             Magnesium Level (test code = 45602-8) 1.7          1.3-2.1         

           





[facility] ChildrenHIV (1&2) Sulnsyig3240-05-11 18:28:00* 



             Test Item    Value        Reference Range Interpretation Comments

 

             HIV (1&2) Antibody (test code = 00305-9) NON-REACTIVE NONREACTIVE  

              





[facility] P24 Ubmlfmw9060-86-65 18:28:00* 



             Test Item    Value        Reference Range Interpretation Comments

 

             HIV P24 Antigen (test code = HIV P24 Antigen) NON-REACTIVE NONREACT

LUIS                





[facility] ChildrenNucleated Red Blood Hxfgy4436-46-17 
07:49:00* 



             Test Item    Value        Reference Range Interpretation Comments

 

             Nucleated Red Blood Cells (test code = 40121-3) 1                  

                     





[facility] ChildrenCreatine Kinase FK9952-17-19 07:17:00* 



             Test Item    Value        Reference Range Interpretation Comments

 

             Creatine Kinase MB (test code = 62109-2) 0.30         0-5.0        

              





[facility] ChildrenTroponin -70-03 07:17:00* 



             Test Item    Value        Reference Range Interpretation Comments

 

             Troponin I (test code = OLQ5175) < 0.001      0-0.300              

      





[facility] ChildrenCreatine Fshpmm3218-59-35 07:09:00* 



             Test Item    Value        Reference Range Interpretation Comments

 

             Creatine Kinase (test code = 2157-6) 33                      

          





[facility] 
05:25:00                                                                        
             [place] [address]      Patient Name: ROBERT VORA                                   MR #: [id number]                     
: 1985                                   Age/Sex: 34/M  Acct #: 
[id number]                              Req #: [id number]  Adm Physician: 
JETT GUILLERMO MD                                      Ordered by: GAIL LUZ, 
ERICA LUZ                            Report #: [id number]        Location: 
MED/SURG3                               Room/Bed: [facility]               
_______________________________________
____________________________________________________________    Procedure: 1106-
0005 DX/CHEST SINGLE (PORTABLE)  Exam Date: 19                            
Exam Time: 0450                                              REPORT STATUS: Sign
ed    EXAMINATION:  CHEST SINGLE (PORTABLE)          INDICATION: Pneumonia.     
 COMPARISON:  Chest radiograph and CT Chest 2019.           FINDINGS:   TU
BES and LINES:  None.      LUNGS: Low lung volumes. Persistent cavitary consolid
ation in the left midlung.   Mild patchy right lower lung opacity.      PLEURA: 
No pleural effusion or pneumothorax.       HEART AND MEDIASTINUM:  The cardiome
diastinal silhouette is unchanged.      BONES AND SOFT TISSUES:  No acute osseou
s abnormality.      UPPER ABDOMEN: No free air under the diaphragm.          IMP
RESSION:    Persistent cavitary consolidation in the left lung, consistent with 
pneumonia   as seen on CT chest. Mild patchy right lower lung opacity may repres
ent   developing pneumonia or atelectasis.      Signed by: Dr. Juan Bond MD on
2019 5:27 AM        Dictated By: JUAN BOND MD  Electronically Signed By: 
JUAN BOND MD on 19  Transcribed By: HI on 19       C
OPY TO:   ERICA REYNOLDS         CT CHEST -00-40 17:31:00                  
                                                                   [place] [address]      Patient Name: ROBERT VORA      
                            MR #: [id number]                     : 1985
                                  Age/Sex: 34/M  Acct #: [id number]           
                  Req #: [id number]  Adm Physician: JETT GUILLERMO MD             
                        Ordered by: ERICA REYNOLDS MD, MD                       
    Report #: [id number]        Location: [facility]                                 
Room/Bed: [place] [address]           _______________________________________
____________________________________________________________    Procedure: 1105-
0033 CT/CT CHEST W  Exam Date:                             Exam Time:           
                                   REPORT STATUS: Signed    CT chest with enhan
cement       CPT code: 39058      INDICATION: Fever, cough      TECHNIQUE: 5 mm 
collimation axial images obtained from the thoracic inlet to   the level of the 
diaphragm following uneventful administration of 100 cc of low   osmolar, nonion
ic intravenous contrast.      RADIATION DOSE:        Total DLP: 573.92 mGy*cm   
    Estimated effective dose: (DLP x 0.015 x size factor) mSv        CTDIvol has
been reviewed. It is below the limits set by the Radiation   Protocol Committee 
(RPC).      Dose reduction techniques used: Automated exposure control, adjust
ment of the   mAs and/or kVp according to patient size, standardized low-dose pr
otocol,   and/or iterative reconstruction technique.      Comparison: Chest x-ra
y 1301 hours.      CHEST FINDINGS:      Lymph nodes: No enlarged axillary or sup
raclavicular lymph nodes. There are   multiple prominent mediastinal lymph nodes
, particularly in the AP window and   the subcarinal spaces. Left hilar lymph no
eboni are prominent.      Thyroid: Visualized portions are normal.      Mediastinu
m: No pericardial effusion.  Heart and great vessels enhance normally   without 
filling defects.  The esophagus is normal.      Lungs:       Right Lung: Subtle 
band of subsegmental atelectasis in the posterior upper   lobe.      Left Lung: 
Large area of dense infiltrate in the upper lobe with several areas   of central
cavitation, the largest measuring 1.8 cm. There are patchy areas of   groundgla
ss attenuation in the superior segment of the lower lobe and in the   lingula.  
   Airways: Clear.      Pleura:  Trace posterior left pleural effusion. No pneu
mothorax.      ABDOMEN:       There are coarse calcifications in the spleen. No 
soft tissue mass or   lymphadenopathy in the visualized upper abdomen.      Bone
s: No focal osseous lesions.      IMPRESSION:      1. Large infiltrate in the le
ft upper lobe with cavitations and multiple   infiltrates in the lingula and low
er lobe. Findings are suggestive of cavitary   pneumonia. Recommend close interv
al follow-up to document interval   change/resolution.      2.  Enlarged mediast
inal and left hilar lymph nodes are likely reactive.      Signed by: Dr. Maggie Kwok MD on 2019 5:40 PM        Dictated By: TIKA KWOK MD  El
ectronically Signed By: TIKA KWOK MD on 19  Transcribed By: DAVIE KAUR on 19       COPY TO:   ERICA REYNOLDS         Urine WBC
2019 16:13:00* 



             Test Item    Value        Reference Range Interpretation Comments

 

             Urine WBC (test code = 5821-4) NONE         0-5                    

    





[facility] [id number] 16:13:00* 



             Test Item    Value        Reference Range Interpretation Comments

 

             Urine RBC (test code = 82265-7) 0-5          0-5                   

     





[facility] ChildrenU[facility] Oalerwai1485-55-79 16:13:00* 



             Test Item    Value        Reference Range Interpretation Comments

 

             Urine Bacteria (test code = 51337-6) MODERATE     NONE             

          





[facility] ChildrenU[facility] Epithelial Dmyzn2402-31-61 16:13:00
  * 



             Test Item    Value        Reference Range Interpretation Comments

 

             Urine Epithelial Cells (test code = 88661-0) NONE         NONE     

                  





[facility] ChildrenUrine Vlrbn2864-53-76 16:03:00* 



             Test Item    Value        Reference Range Interpretation Comments

 

             Urine Color (test code = 5778-6) YELLOW       YELLOW               

      





[facility] Oqlqfsq3424-22-82 16:03:00* 



             Test Item    Value        Reference Range Interpretation Comments

 

             Urine Clarity (test code = 33581-4) SL CLOUDY    CLEAR             

         





[facility] Specific Iqucixf9721-56-31 16:03:00
  * 



             Test Item    Value        Reference Range Interpretation Comments

 

             Urine Specific Gravity (test code = 5811-5) 1.015        1.010-1.02

5                





[facility] oO9862-00-24 16:03:00* 



             Test Item    Value        Reference Range Interpretation Comments

 

             Urine pH (test code = 21214-8) 6            5-7                    

    





[facility] Leukocyte Hadbvblm6709-05-46 
16:03:00* 



             Test Item    Value        Reference Range Interpretation Comments

 

             Urine Leukocyte Esterase (test code = 38619-6) NEGATIVE     NEGATIV

E                   





[facility] Zsnkscd8783-63-32 16:03:00* 



             Test Item    Value        Reference Range Interpretation Comments

 

             Urine Nitrite (test code = 45192-1) NEGATIVE     NEGATIVE          

         





[facility] Iudxzvu7349-10-42 16:03:00* 



             Test Item    Value        Reference Range Interpretation Comments

 

             Urine Protein (test code = 57735-3) 2+           NEGATIVE          

         





[facility] Glucose (UA)2019 16:03:00* 



             Test Item    Value        Reference Range Interpretation Comments

 

             Urine Glucose (UA) (test code = 42366-6) NEGATIVE     NEGATIVE     

              





[facility] Wnghgjp4750-15-46 16:03:00* 



             Test Item    Value        Reference Range Interpretation Comments

 

             Urine Ketones (test code = 64786-4) TRACE        NEGATIVE          

         





[facility] Qfdxadyqgkxb4469-13-39 16:03:00* 



             Test Item    Value        Reference Range Interpretation Comments

 

             Urine Urobilinogen (test code = 46513-9) 1            0.2-1        

              





[facility] Kvugtevyz4391-99-89 16:03:00* 



             Test Item    Value        Reference Range Interpretation Comments

 

             Urine Bilirubin (test code = 1977-8) NEGATIVE     NEGATIVE         

          





[facility] Ywncw7577-92-75 16:03:00* 



             Test Item    Value        Reference Range Interpretation Comments

 

             Urine Blood (test code = 22256-4) 1+           NEGATIVE            

       





[facility] ChildrenCHE[facility] UMAYW4509-58-69 13:11:00         
                                                                            [facility]                        [address] [place] [address]      Patient Name: ROBERT VORA      
                            MR #: [id number]                     : 1985
                                  Age/Sex: 34/M  Acct #: [id number]           
                  Req #: [id number]  Adm Physician:                             
                        Ordered by: GAIL LUZ, ERICA LUZ                       
    Report #: [id number]        Location: ER                                     
Room/Bed:                     _______________________________________
____________________________________________________________    Procedure: 1105-
0051 DX/CHEST 2 VIEWS  Exam Date:                             Exam Time:        
                                      REPORT STATUS: Signed    EXAMINATION:  
EST 2 VIEWS          INDICATION: Chest pain, shortness of breath      COMPARISON
: None           FINDINGS:      LINES/TUBES:None      LUNGS:The lungs are well-i
nflated. There is a 6.5 x 5.5 cm consolidation at the   lateral left midlung zon
e with internal lucent component.      PLEURA:No pleural effusion or pneumothora
x.      MEDIASTINUM:The cardiomediastinal silhouette appears normal in size and 
shape.      BONES/SOFT TISSUES:No acute osseous injury.      ABDOMEN:No free air
under the diaphragm.         IMPRESSION:    Left midlung zone 6.5 x 5.5 cm cons
olidation with internal lucency for which   the differential includes pneumonia 
or a cavitary mass lesion.      RECOMMENDATION:   Chest CT for further evaluatio
n.      Signed by: Stacy Benton MD on 2019 1:13 PM        Dictated By: FERNANDO BENTON MD  Electronically Signed By: STACY BENTON MD on 19 1313  Transcribed 
By: HI on 19 1313       COPY TO:   ERICA REYNOLDS         Influenza 
Virus Types A,B Hfsqqoa1393-01-50 12:54:00* 



             Test Item    Value        Reference Range Interpretation Comments

 

             Influenza Virus Types A,B Antigen (test code = 72146-8) NEGATIVE   

  NEGATIVE                   





[facility] ChildrenGroup A Streptococcus Tfrnyh5394-47-86 
12:48:00* 



             Test Item    Value        Reference Range Interpretation Comments

 

             Group A Streptococcus Screen (test code = 05951-5) POSITIVE     NEG

ATIVE                   





[facility]

## 2020-05-20 NOTE — NUR
Pt admitted to room 213 via WC from home. Patient alert and orient to name, hospital, 
situation, and diagnosis: Cystitis, Renal Failure. Pt skin warm and dry. Patient denies pain 
at this time. Lungs CTA. cap refill <3 secs. Last BM 5/19. Denies dysuria, c/o dark urine. 
pp +4. Pt oriented to room, call light within reach. Bed low and locked.

## 2020-05-20 NOTE — NUR
RCD PT AT BED PT IS ALERT AND ORIENTED RESTING ON BED IV PATENT BED LOW AND LOCKED CALL 
LIGHT IN REACH

## 2020-05-21 VITALS — SYSTOLIC BLOOD PRESSURE: 117 MMHG | DIASTOLIC BLOOD PRESSURE: 96 MMHG

## 2020-05-21 VITALS — SYSTOLIC BLOOD PRESSURE: 129 MMHG | DIASTOLIC BLOOD PRESSURE: 77 MMHG

## 2020-05-21 VITALS — SYSTOLIC BLOOD PRESSURE: 134 MMHG | DIASTOLIC BLOOD PRESSURE: 70 MMHG

## 2020-05-21 VITALS — SYSTOLIC BLOOD PRESSURE: 134 MMHG | DIASTOLIC BLOOD PRESSURE: 85 MMHG

## 2020-05-21 LAB
ANION GAP SERPL CALC-SCNC: 14 MMOL/L (ref 8–16)
BASOPHILS # BLD AUTO: 0 10*3/UL (ref 0–0.1)
BASOPHILS NFR BLD AUTO: 0.4 % (ref 0–1)
BUN SERPL-MCNC: 17 MG/DL (ref 7–26)
BUN/CREAT SERPL: 13 (ref 6–25)
CALCIUM SERPL-MCNC: 8.4 MG/DL (ref 8.4–10.2)
CHLORIDE SERPL-SCNC: 104 MMOL/L (ref 98–107)
CK SERPL-CCNC: 210 IU/L (ref 30–200)
CO2 SERPL-SCNC: 24 MMOL/L (ref 22–29)
DEPRECATED NEUTROPHILS # BLD AUTO: 4.2 10*3/UL (ref 2.1–6.9)
EGFRCR SERPLBLD CKD-EPI 2021: > 60 ML/MIN (ref 60–?)
EOSINOPHIL # BLD AUTO: 0.1 10*3/UL (ref 0–0.4)
EOSINOPHIL NFR BLD AUTO: 1.3 % (ref 0–6)
ERYTHROCYTE [DISTWIDTH] IN CORD BLOOD: 13.7 % (ref 11.7–14.4)
GLUCOSE SERPLBLD-MCNC: 91 MG/DL (ref 74–118)
HCT VFR BLD AUTO: 38 % (ref 38.2–49.6)
HGB BLD-MCNC: 12.6 G/DL (ref 14–18)
LYMPHOCYTES # BLD: 1.8 10*3/UL (ref 1–3.2)
LYMPHOCYTES NFR BLD AUTO: 27 % (ref 18–39.1)
MCH RBC QN AUTO: 29.4 PG (ref 28–32)
MCHC RBC AUTO-ENTMCNC: 33.2 G/DL (ref 31–35)
MCV RBC AUTO: 88.8 FL (ref 81–99)
MONOCYTES # BLD AUTO: 0.6 10*3/UL (ref 0.2–0.8)
MONOCYTES NFR BLD AUTO: 9 % (ref 4.4–11.3)
NEUTS SEG NFR BLD AUTO: 62 % (ref 38.7–80)
PLATELET # BLD AUTO: 181 X10E3/UL (ref 140–360)
POTASSIUM SERPL-SCNC: 4 MMOL/L (ref 3.5–5.1)
RBC # BLD AUTO: 4.28 X10E6/UL (ref 4.3–5.7)
SODIUM SERPL-SCNC: 138 MMOL/L (ref 136–145)

## 2020-05-21 RX ADMIN — SODIUM CHLORIDE SCH MLS/HR: 9 INJECTION, SOLUTION INTRAVENOUS at 03:24

## 2020-05-21 NOTE — NUR
BSSR GIVEN TO ONCOMING SHIFT VERBALLY, NO DISTRESS NOTED, CALL LIGHT WITHIN REACH UPDATED 
WITH CURRENT LABS AND PLAN OF CARE

## 2021-07-03 ENCOUNTER — HOSPITAL ENCOUNTER (EMERGENCY)
Dept: HOSPITAL 88 - ER | Age: 36
Discharge: HOME | End: 2021-07-03
Payer: SELF-PAY

## 2021-07-03 VITALS — HEIGHT: 66 IN | BODY MASS INDEX: 27.64 KG/M2 | WEIGHT: 172 LBS

## 2021-07-03 DIAGNOSIS — R07.89: Primary | ICD-10-CM

## 2021-07-03 DIAGNOSIS — J18.9: ICD-10-CM

## 2021-07-03 LAB
ALBUMIN SERPL-MCNC: 4.2 G/DL (ref 3.5–5)
ALBUMIN/GLOB SERPL: 1 {RATIO} (ref 0.8–2)
ALP SERPL-CCNC: 57 IU/L (ref 40–150)
ALT SERPL-CCNC: 20 IU/L (ref 0–55)
AMPHETAMINES UR QL SCN>1000 NG/ML: NEGATIVE
ANION GAP SERPL CALC-SCNC: 16.1 MMOL/L (ref 8–16)
BASOPHILS # BLD AUTO: 0 10*3/UL (ref 0–0.1)
BASOPHILS NFR BLD AUTO: 0.6 % (ref 0–1)
BENZODIAZ UR QL SCN: NEGATIVE
BUN SERPL-MCNC: 17 MG/DL (ref 7–26)
BUN/CREAT SERPL: 15 (ref 6–25)
CALCIUM SERPL-MCNC: 8.7 MG/DL (ref 8.4–10.2)
CHLORIDE SERPL-SCNC: 108 MMOL/L (ref 98–107)
CK MB SERPL-MCNC: 6.4 NG/ML (ref 0–5)
CK MB SERPL-MCNC: 7.8 NG/ML (ref 0–5)
CK SERPL-CCNC: 1013 IU/L (ref 30–200)
CK SERPL-CCNC: 1356 IU/L (ref 30–200)
CO2 SERPL-SCNC: 19 MMOL/L (ref 22–29)
DEPRECATED NEUTROPHILS # BLD AUTO: 4.4 10*3/UL (ref 2.1–6.9)
EGFRCR SERPLBLD CKD-EPI 2021: 75 ML/MIN (ref 60–?)
EOSINOPHIL # BLD AUTO: 0 10*3/UL (ref 0–0.4)
EOSINOPHIL NFR BLD AUTO: 0.6 % (ref 0–6)
ERYTHROCYTE [DISTWIDTH] IN CORD BLOOD: 14.3 % (ref 11.7–14.4)
GLOBULIN PLAS-MCNC: 4.1 G/DL (ref 2.3–3.5)
GLUCOSE SERPLBLD-MCNC: 103 MG/DL (ref 74–118)
HCT VFR BLD AUTO: 42.3 % (ref 38.2–49.6)
HGB BLD-MCNC: 13.9 G/DL (ref 14–18)
LYMPHOCYTES # BLD: 1.7 10*3/UL (ref 1–3.2)
LYMPHOCYTES NFR BLD AUTO: 25.4 % (ref 18–39.1)
MCH RBC QN AUTO: 29 PG (ref 28–32)
MCHC RBC AUTO-ENTMCNC: 32.9 G/DL (ref 31–35)
MCV RBC AUTO: 88.1 FL (ref 81–99)
MONOCYTES # BLD AUTO: 0.5 10*3/UL (ref 0.2–0.8)
MONOCYTES NFR BLD AUTO: 7.5 % (ref 4.4–11.3)
NEUTS SEG NFR BLD AUTO: 65.7 % (ref 38.7–80)
PCP UR QL SCN: NEGATIVE
PLATELET # BLD AUTO: 198 X10E3/UL (ref 140–360)
POTASSIUM SERPL-SCNC: 3.1 MMOL/L (ref 3.5–5.1)
RBC # BLD AUTO: 4.8 X10E6/UL (ref 4.3–5.7)
SODIUM SERPL-SCNC: 140 MMOL/L (ref 136–145)

## 2021-07-03 PROCEDURE — 71260 CT THORAX DX C+: CPT

## 2021-07-03 PROCEDURE — 93005 ELECTROCARDIOGRAM TRACING: CPT

## 2021-07-03 PROCEDURE — 71045 X-RAY EXAM CHEST 1 VIEW: CPT

## 2021-07-03 PROCEDURE — 84484 ASSAY OF TROPONIN QUANT: CPT

## 2021-07-03 PROCEDURE — 80320 DRUG SCREEN QUANTALCOHOLS: CPT

## 2021-07-03 PROCEDURE — 36415 COLL VENOUS BLD VENIPUNCTURE: CPT

## 2021-07-03 PROCEDURE — 80307 DRUG TEST PRSMV CHEM ANLYZR: CPT

## 2021-07-03 PROCEDURE — 80053 COMPREHEN METABOLIC PANEL: CPT

## 2021-07-03 PROCEDURE — 82550 ASSAY OF CK (CPK): CPT

## 2021-07-03 PROCEDURE — 82553 CREATINE MB FRACTION: CPT

## 2021-07-03 PROCEDURE — 85025 COMPLETE CBC W/AUTO DIFF WBC: CPT

## 2021-07-03 PROCEDURE — 83735 ASSAY OF MAGNESIUM: CPT

## 2021-07-03 PROCEDURE — 99284 EMERGENCY DEPT VISIT MOD MDM: CPT
